# Patient Record
Sex: FEMALE | Race: WHITE
[De-identification: names, ages, dates, MRNs, and addresses within clinical notes are randomized per-mention and may not be internally consistent; named-entity substitution may affect disease eponyms.]

---

## 2017-11-26 ENCOUNTER — HOSPITAL ENCOUNTER (EMERGENCY)
Dept: HOSPITAL 10 - E/R | Age: 82
Discharge: HOME | End: 2017-11-26
Payer: MEDICARE

## 2017-11-26 VITALS
BODY MASS INDEX: 25.97 KG/M2 | HEIGHT: 60 IN | WEIGHT: 132.28 LBS | WEIGHT: 132.28 LBS | HEIGHT: 60 IN | BODY MASS INDEX: 25.97 KG/M2
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
## 2017-11-26 DIAGNOSIS — S01.01XA: Primary | ICD-10-CM
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
## 2017-11-26 DIAGNOSIS — E03.9: ICD-10-CM

## 2017-11-26 PROCEDURE — 70450 CT HEAD/BRAIN W/O DYE: CPT

## 2017-11-26 PROCEDURE — 71010: CPT

## 2017-11-26 PROCEDURE — 72125 CT NECK SPINE W/O DYE: CPT

## 2017-11-26 NOTE — RADRPT
PROCEDURE:   XR Chest. 

 

CLINICAL INDICATION:   Chest pain

 

TECHNIQUE:   Single frontal view  of the chest was obtained. 

 

COMPARISON:   None 

 

FINDINGS:

The heart is within normal limits.  The thoracic aorta is calcified.

There are mild bilateral lower lobe linear atelectatic changes.

The lungs are otherwise clear.

There is no pleural effusion or pneumothorax.   

 

RPTAT: AA

 

IMPRESSION:

Mild bilateral lower lobe linear atelectatic changes.

Calcified aorta consistent with atherosclerotic disease.

_____________________________________________ 

.Wolfgang Ramírez MD, MD           Date    Time 

Electronically viewed and signed by .Wolfgang Ramírez MD, MD on 11/26/2017 13:17 

 

D:  11/26/2017 13:17  T:  11/26/2017 13:17

.S/

## 2017-11-26 NOTE — RADRPT
PROCEDURE:  CT cervical spine without contrast. 

 

CLINICAL INDICATION:   Neck trauma/injury, fall

 

TECHNIQUE:  CT of the cervical spine without contrast was performed on a multidetector CT scanner, w
ith multiplanar reformats.  One or more of the following dose reduction techniques were used: Automa
tiera exposure control, adjustment in mA and / or kV according to patient size, use of iterative recon
structive technique.  CTDIvol = 22 mGy and DLP = 520 mGy-cm.  DICOM images are available.

 

COMPARISON:   None available.

 

FINDINGS:

There is generalized osteopenia. No acute fracture or dislocation is identified. There is a chronic 
corticated defect in the posterior arch of C1 which may be developmental, versus post-traumatic.  Th
ere is reversal of the lordosis of the upper - mid cervical spine.  Trace anterolisthesis at C2-3 an
d C7-T1, and trace retrolisthesis at C4-5 are seen.  The vertebral bodies are maintained in height. 
  There are atlantoaxial joint degenerative changes. There are multilevel anterior osteophytes.  The
re is disc space narrowing, severe at C4-5, moderate to severe at C3-4, mild-moderate at C2-3 , C5-6
 and C6-7. Additional findings by levels:

 

C2-3: Posterior disk/osteophyte and ligamentum flavum hypertrophy. Mild to moderate central canal st
enosis.   Uncovertebral osteophytes and facet arthropathy with severe right foraminal narrowing.

 

C3-4: Posterior disk/osteophyte with mild central canal stenosis.   Uncovertebral osteophytes and fa
cet arthropathy with severe right, mild-moderate left foraminal narrowing.

 

C4-5: Posterior disk/osteophyte with moderate - severe central canal stenosis.   Uncovertebral osteo
phytes and facet arthropathy with severe bilateral foraminal narrowing.

 

C5-6: Posterior disk/osteophyte with mild - moderate central canal stenosis.   Uncovertebral osteoph
ytes and facet arthropathy with moderate - severe bilateral foraminal narrowing.

 

C6-7:  Posterior disk/osteophyte with mild - moderate central canal stenosis.   Uncovertebral osteop
hytes and facet arthropathy with mild - moderate bilateral foraminal narrowing

 

C7-T1: Facet arthropathy.  No acquired central canal stenosis identified.

 

  

 

IMPRESSION:

1.  No acute fracture/dislocation identified.

2.  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
umatic. 

3.  Cervical spondylosis/degenerative enthesopathy as described above, with reversal of the cervical
 lordosis.

4.  Central canal stenosis, moderate - severe at C4-5, mild - moderate at C2-3, C5-6 and C6-7, mild 
at C3-4.

5.  Significant multilevel foraminal narrowing detailed above.

6.  Osteopenia.

 

 

RPTAT: HH

_____________________________________________ 

.Carlos Enrique Jean MD, MD           Date    Time 

Electronically viewed and signed by .Carlos Enrique Jean MD, MD on 11/26/2017 14:04 

 

D:  11/26/2017 14:04  T:  11/26/2017 14:04

.O/

## 2017-11-26 NOTE — ERD
ER Documentation


Chief Complaint


Chief Complaint


MECHANICAL FALL LAC TO HEAD





HPI


Patient is a 86-year-old female who presents after a witnessed ground-level 

mechanical fall at her nursing home with injury to her head.  There is no 

report of loss of consciousness or vomiting.  There is no report of other 

injury.  There is no report of any physical complaint prior to the episode.  

History is limited due to patient's underlying dementia.  On further history 

from the nursing home, the patient was noted to fall backwards in her chair 

while she was reaching to pet a dog.





ROS


All systems reviewed and are negative except as per history of present illness.





Medications


Home Meds


Reported Medications


Acetaminophen* (Acetaminophen*) 500 MG Extra Strength Tablet, 1000 MG PO QHS Y 

for PAIN AND OR ELEVATED TEMP, TAB


   11/26/17


Trazodone Hcl* (Trazodone Hcl*) 50 Mg Tablet, 50 MG PO Q8 Y for SLEEP, #60 TAB


   11/26/17


Memantine* (Namenda* XR) 28 Mg Cap.spr.24, 28 MG PO DAILY, #30 TAB


   11/26/17


Loratadine* (Loratadine*) 10 Mg Tablet, 10 MG PO DAILY, #30 TAB


   11/26/17


Levothyroxine Sodium* (Levoxyl*) 150 Mcg Tablet, 150 MCG PO WED,AND SUN, #30 TAB


   11/26/17


Levothyroxine Sodium* (Levoxyl*) 125 Mcg Tablet, 125 MCG PO MON,TUE,THUR,FRI,SAT

, #30 TAB


   11/26/17


Donepezil* (Donepezil*) 5 Mg Tablet, 5 MG PO QHS, #30 TAB


   11/26/17


Cyanocobalamin* (Vitamin B12*) 500 Mcg Tab, 500 MCG PO DAILY, TAB


   11/26/17


Cholecalciferol (Vitamin D3) (VITAMIN D-3) 2,000 Unit Capsule, 2000 UNIT PO 

DAILY, CAP


   11/26/17


Celecoxib* (Celebrex*) 100 Mg Capsule, 100 MG PO BID, CAP


   11/26/17


Salmeterol Xinaf/Fluticasone* (Advair*) 250-50 Diskus Inhaler, 1 INH INHALATION 

BID, #1 INHALER


   11/26/17





Allergies


Allergies:  


Coded Allergies:  


     Sulfa (Sulfonamide Antibiotics) (Verified  Allergy, Unknown, 11/26/17)





PMhx/Soc


Past medical history: Dementia, hypothyroidism


Past surgical history: Unable to obtain


Social history: Lives in nursing home, reports occasional prior tobacco


Medical and Surgical Hx:  pt denies Medical Hx, pt denies Surgical Hx


Hx Alcohol Use:  No


Hx Substance Use:  No


Hx Tobacco Use:  No


Smoking Status:  Never smoker





FmHx


Noncontributory





Physical Exam


Vitals





Vital Signs








  Date Time  Temp Pulse Resp B/P Pulse Ox O2 Delivery O2 Flow Rate FiO2


 


11/26/17 12:27 98.0 72 16 141/79 95   








Physical Exam


Const:     Alert, no acute distress


Head:   Atraumatic, Left occipital laceration, approximately 3 cm, linear.  

Soft tissue hematoma.


Eyes:    Normal Conjunctiva


ENT:    Normal External Ears, Nose and Mouth. Mucous membranes moist


Neck:               Full range of motion.  No midline tenderness


Resp:    Clear to auscultation bilaterally, No wheezes, no rales


Cardio:    Regular rate and rhythm, no murmurs


Abd:    Soft, non tender, non distended. Normal bowel sounds


Skin:    No petechiae or rashes


Back:    No midline or flank tenderness


Ext:    No cyanosis, or edema, No deformity or pain with ranging of joints.


Neur:    Awake and alert, Cranial nerves II through XII intact bilaterally, 

strength and sensation full in 4 extremities.


Psych:    Normal Mood and Affect


Results 24 hrs








 Current Medications








 Medications


  (Trade)  Dose


 Ordered  Sig/Agnes


 Route


 PRN Reason  Start Time


 Stop Time Status Last Admin


Dose Admin


 


 Lidocaine/


 Epinephrine


  (Xylocaine 1%/


 Epi (Pf))  30 ml  ONCE  STAT


 INJ


   11/26/17 12:50


 11/26/17 12:51 DC  


 














Procedures/MDM


Laceration Repair by me:


Anesthesia:                             1% lidocaine with epinephrine locally


Location:                                  Left occiput


Foreign body:                           None detected after copious irrigation 

and exploration


Technique:                              Staples 4


Complexity:                             No subcutaneous sutures/mucosal repair/

edge excision


Post Closure Length:             3 cm





Patient's bleeding was easily controlled in the department and there is no 

indication of anemia.


No evidence of neurologic injury, vascular injury, or foreign body.


Patient is appropriate for outpatient follow up.





MDM: Patient is an 86-year-old female who had a mechanical fall from a chair 

with trauma to her head.  CT of the head shows no acute injury.  CT of the 

cervical spine shows no fracture but does show significant degenerative 

disease.  The patient has a normal neurological examination.  She has no 

midline tenderness.  There are no other signs of injury and tertiary survey.  

The patient is not on blood thinners.  Laceration was irrigated with copious 

saline and repaired with staples 4.  Instructions given for staple removal in 

7 days.  Tetanus is up-to-date per daughter.





Departure


Diagnosis:  


 Primary Impression:  


 Scalp laceration


 Encounter type:  initial encounter  Qualified Code:  S01.01XA - Laceration of 

scalp, initial encounter


Condition:  Stable











HIREN BROOKS MD Nov 26, 2017 12:50

## 2017-11-26 NOTE — RADRPT
PROCEDURE:  CT brain without IV contrast. 

 

CLINICAL INDICATION:  Headache/trauma. 

 

TECHNIQUE:   CT examination of the brain was performed on a 64-slice multidetector scanner.  The pat
ient was examined without IV contrast.  Sagittal and coronal reformatted images were made.  The imag
es were reviewed on a PACS workstation.  DICOM images are available.

 

Total radiation dose: Total CTDIvol:   44.9 mGy. Total DLP: 720 mGy-cm.  One or more of the followin
g dose reduction techniques were used: automated exposure control, adjustment of the mA and/or kV ac
cording to patient size, or use of iterative reconstruction technique

 

 

COMPARISON:   None available. 

 

FINDINGS:

 

There is mild cerebral atrophy.  There is mild periventricular low density white matter changes, lik
ely microvascular ischemic changes.  The gray/white matter differentiation is well preserved.  There
 is no other abnormal intra-axial high, low density lesion, suggesting tumor, infarct, bleeding, av 
malformation or inflammatory mass.  

 

No subdural or epidural hematoma.  The visualized paranasal sinuses and mastoid air cells are clear.
  The orbits are unremarkable.  The calvarium is intact.  There is mild left parietal scalp swelling
.

 

IMPRESSION:

 

1.  Mild cerebral atrophy.

 

2.  Mild periventricular low density white matter changes, likely microvascular ischemic changes. 

 

RPTAT: GG

_____________________________________________ 

.Monroe Cui MD, MD           Date    Time 

Electronically viewed and signed by .Monroe Cui MD, MD on 11/26/2017 13:39 

 

D:  11/26/2017 13:39  T:  11/26/2017 13:39

.Y/

## 2019-01-12 ENCOUNTER — HOSPITAL ENCOUNTER (INPATIENT)
Dept: HOSPITAL 91 - E/R | Age: 84
LOS: 7 days | Discharge: NURSING, REHAB OR LONG-TERM CARE | DRG: 871 | End: 2019-01-19
Payer: MEDICARE

## 2019-01-12 ENCOUNTER — HOSPITAL ENCOUNTER (INPATIENT)
Dept: HOSPITAL 10 - E/R | Age: 84
LOS: 7 days | Discharge: NURSING, REHAB OR LONG-TERM CARE | DRG: 871 | End: 2019-01-19
Admitting: INTERNAL MEDICINE
Payer: MEDICARE

## 2019-01-12 VITALS — HEART RATE: 80 BPM | DIASTOLIC BLOOD PRESSURE: 77 MMHG | RESPIRATION RATE: 18 BRPM | SYSTOLIC BLOOD PRESSURE: 170 MMHG

## 2019-01-12 VITALS — SYSTOLIC BLOOD PRESSURE: 141 MMHG | RESPIRATION RATE: 18 BRPM | HEART RATE: 80 BPM | DIASTOLIC BLOOD PRESSURE: 78 MMHG

## 2019-01-12 VITALS — DIASTOLIC BLOOD PRESSURE: 67 MMHG | HEART RATE: 102 BPM | RESPIRATION RATE: 16 BRPM | SYSTOLIC BLOOD PRESSURE: 143 MMHG

## 2019-01-12 VITALS
HEIGHT: 65 IN | WEIGHT: 167.33 LBS | HEIGHT: 65 IN | WEIGHT: 167.33 LBS | BODY MASS INDEX: 27.88 KG/M2 | BODY MASS INDEX: 27.88 KG/M2

## 2019-01-12 DIAGNOSIS — R13.10: ICD-10-CM

## 2019-01-12 DIAGNOSIS — Z79.82: ICD-10-CM

## 2019-01-12 DIAGNOSIS — J44.0: ICD-10-CM

## 2019-01-12 DIAGNOSIS — Z86.73: ICD-10-CM

## 2019-01-12 DIAGNOSIS — Z88.2: ICD-10-CM

## 2019-01-12 DIAGNOSIS — F03.90: ICD-10-CM

## 2019-01-12 DIAGNOSIS — B34.9: ICD-10-CM

## 2019-01-12 DIAGNOSIS — Z87.891: ICD-10-CM

## 2019-01-12 DIAGNOSIS — E11.9: ICD-10-CM

## 2019-01-12 DIAGNOSIS — N39.0: ICD-10-CM

## 2019-01-12 DIAGNOSIS — Z90.12: ICD-10-CM

## 2019-01-12 DIAGNOSIS — Z66: ICD-10-CM

## 2019-01-12 DIAGNOSIS — A41.9: Primary | ICD-10-CM

## 2019-01-12 DIAGNOSIS — J40: ICD-10-CM

## 2019-01-12 DIAGNOSIS — J18.9: ICD-10-CM

## 2019-01-12 DIAGNOSIS — I10: ICD-10-CM

## 2019-01-12 DIAGNOSIS — J96.01: ICD-10-CM

## 2019-01-12 DIAGNOSIS — R62.7: ICD-10-CM

## 2019-01-12 DIAGNOSIS — E03.9: ICD-10-CM

## 2019-01-12 LAB
ADD MAN DIFF?: NO
ADD UMIC: YES
ALANINE AMINOTRANSFERASE: 21 IU/L (ref 13–69)
ALBUMIN/GLOBULIN RATIO: 0.95
ALBUMIN: 3.8 G/DL (ref 3.3–4.9)
ALKALINE PHOSPHATASE: 139 IU/L (ref 42–121)
AMYLASE: 36 U/L (ref 11–123)
ANION GAP: 4 (ref 5–13)
ASPARTATE AMINO TRANSFERASE: 30 IU/L (ref 15–46)
B-TYPE NATRIURETIC PEPTIDE: 4900 PG/ML (ref 0–450)
BASOPHIL #: 0.1 10^3/UL (ref 0–0.1)
BASOPHILS %: 0.4 % (ref 0–2)
BILIRUBIN,DIRECT: 0 MG/DL (ref 0–0.2)
BILIRUBIN,TOTAL: 0.4 MG/DL (ref 0.2–1.3)
BLOOD UREA NITROGEN: 16 MG/DL (ref 7–20)
CALCIUM: 10.2 MG/DL (ref 8.4–10.2)
CARBON DIOXIDE: 35 MMOL/L (ref 21–31)
CHLORIDE: 101 MMOL/L (ref 97–110)
CREATININE: 0.83 MG/DL (ref 0.44–1)
EOSINOPHILS #: 0 10^3/UL (ref 0–0.5)
EOSINOPHILS %: 0.1 % (ref 0–7)
FREE T4 (FREE THYROXINE): 2.01 NG/DL (ref 0.85–1.93)
GLOBULIN: 4 G/DL (ref 1.3–3.2)
GLUCOSE: 152 MG/DL (ref 70–220)
HEMATOCRIT: 42.3 % (ref 37–47)
HEMOGLOBIN A1C: 5.2 % (ref 0–5.9)
HEMOGLOBIN: 13.4 G/DL (ref 12–16)
IMMATURE GRANS #M: 0.16 10^3/UL (ref 0–0.03)
IMMATURE GRANS % (M): 1 % (ref 0–0.43)
INR: 0.98
LACTIC ACID: 2.5 MMOL/L (ref 0.5–2)
LIPASE: 12 U/L (ref 23–300)
LYMPHOCYTES #: 2.1 10^3/UL (ref 0.8–2.9)
LYMPHOCYTES %: 13.1 % (ref 15–51)
MEAN CORPUSCULAR HEMOGLOBIN: 29.8 PG (ref 29–33)
MEAN CORPUSCULAR HGB CONC: 31.7 G/DL (ref 32–37)
MEAN CORPUSCULAR VOLUME: 94.2 FL (ref 82–101)
MEAN PLATELET VOLUME: 11.4 FL (ref 7.4–10.4)
MONOCYTE #: 1 10^3/UL (ref 0.3–0.9)
MONOCYTES %: 6.1 % (ref 0–11)
NEUTROPHIL #: 12.5 10^3/UL (ref 1.6–7.5)
NEUTROPHILS %: 79.3 % (ref 39–77)
NUCLEATED RED BLOOD CELLS #: 0 10^3/UL (ref 0–0)
NUCLEATED RED BLOOD CELLS%: 0 /100WBC (ref 0–0)
PARTIAL THROMBOPLASTIN TIME: 29.1 SEC (ref 23–35)
PLATELET COUNT: 277 10^3/UL (ref 140–415)
POTASSIUM: 4.1 MMOL/L (ref 3.5–5.1)
PROTIME: 13.1 SEC (ref 11.9–14.9)
PT RATIO: 1
RED BLOOD COUNT: 4.49 10^6/UL (ref 4.2–5.4)
RED CELL DISTRIBUTION WIDTH: 13.7 % (ref 11.5–14.5)
SODIUM: 140 MMOL/L (ref 135–144)
THYROID STIMULATING HORMONE: 0.77 MIU/L (ref 0.47–4.68)
TOTAL PROTEIN: 7.8 G/DL (ref 6.1–8.1)
TROPONIN-I: 0.03 NG/ML (ref 0–0.12)
UR ASCORBIC ACID: 40 MG/DL
UR BACTERIA: (no result) /HPF
UR BILIRUBIN (DIP): NEGATIVE MG/DL
UR BLOOD (DIP): NEGATIVE MG/DL
UR CLARITY: (no result)
UR COLOR: (no result)
UR GLUCOSE (DIP): NEGATIVE MG/DL
UR GRANULAR CAST: (no result) /HPF
UR HYALINE CAST: (no result) /HPF
UR KETONES (DIP): NEGATIVE MG/DL
UR LEUKOCYTE ESTERASE (DIP): (no result) LEU/UL
UR MUCUS: (no result) /HPF
UR NITRITE (DIP): POSITIVE MG/DL
UR PH (DIP): 5 (ref 5–9)
UR RBC: 3 /HPF (ref 0–5)
UR SPECIFIC GRAVITY (DIP): 1.03 (ref 1–1.03)
UR SQUAMOUS EPITHELIAL CELL: (no result) /HPF
UR TOTAL PROTEIN (DIP): (no result) MG/DL
UR UROBILINOGEN (DIP): (no result) MG/DL
UR WBC: 61 /HPF (ref 0–5)
WHITE BLOOD COUNT: 15.8 10^3/UL (ref 4.8–10.8)

## 2019-01-12 PROCEDURE — 80053 COMPREHEN METABOLIC PANEL: CPT

## 2019-01-12 PROCEDURE — 94640 AIRWAY INHALATION TREATMENT: CPT

## 2019-01-12 PROCEDURE — 87086 URINE CULTURE/COLONY COUNT: CPT

## 2019-01-12 PROCEDURE — 83880 ASSAY OF NATRIURETIC PEPTIDE: CPT

## 2019-01-12 PROCEDURE — 82150 ASSAY OF AMYLASE: CPT

## 2019-01-12 PROCEDURE — 96375 TX/PRO/DX INJ NEW DRUG ADDON: CPT

## 2019-01-12 PROCEDURE — 97530 THERAPEUTIC ACTIVITIES: CPT

## 2019-01-12 PROCEDURE — 84443 ASSAY THYROID STIM HORMONE: CPT

## 2019-01-12 PROCEDURE — 83735 ASSAY OF MAGNESIUM: CPT

## 2019-01-12 PROCEDURE — 85025 COMPLETE CBC W/AUTO DIFF WBC: CPT

## 2019-01-12 PROCEDURE — 84484 ASSAY OF TROPONIN QUANT: CPT

## 2019-01-12 PROCEDURE — C9113 INJ PANTOPRAZOLE SODIUM, VIA: HCPCS

## 2019-01-12 PROCEDURE — 84439 ASSAY OF FREE THYROXINE: CPT

## 2019-01-12 PROCEDURE — 97161 PT EVAL LOW COMPLEX 20 MIN: CPT

## 2019-01-12 PROCEDURE — 97110 THERAPEUTIC EXERCISES: CPT

## 2019-01-12 PROCEDURE — 93005 ELECTROCARDIOGRAM TRACING: CPT

## 2019-01-12 PROCEDURE — 94644 CONT INHLJ TX 1ST HOUR: CPT

## 2019-01-12 PROCEDURE — 96365 THER/PROPH/DIAG IV INF INIT: CPT

## 2019-01-12 PROCEDURE — 85610 PROTHROMBIN TIME: CPT

## 2019-01-12 PROCEDURE — 83036 HEMOGLOBIN GLYCOSYLATED A1C: CPT

## 2019-01-12 PROCEDURE — 96366 THER/PROPH/DIAG IV INF ADDON: CPT

## 2019-01-12 PROCEDURE — 87081 CULTURE SCREEN ONLY: CPT

## 2019-01-12 PROCEDURE — 92610 EVALUATE SWALLOWING FUNCTION: CPT

## 2019-01-12 PROCEDURE — 85730 THROMBOPLASTIN TIME PARTIAL: CPT

## 2019-01-12 PROCEDURE — 83690 ASSAY OF LIPASE: CPT

## 2019-01-12 PROCEDURE — 87400 INFLUENZA A/B EACH AG IA: CPT

## 2019-01-12 PROCEDURE — 99291 CRITICAL CARE FIRST HOUR: CPT

## 2019-01-12 PROCEDURE — 83605 ASSAY OF LACTIC ACID: CPT

## 2019-01-12 PROCEDURE — 92526 ORAL FUNCTION THERAPY: CPT

## 2019-01-12 PROCEDURE — 94664 DEMO&/EVAL PT USE INHALER: CPT

## 2019-01-12 PROCEDURE — 36415 COLL VENOUS BLD VENIPUNCTURE: CPT

## 2019-01-12 PROCEDURE — 80048 BASIC METABOLIC PNL TOTAL CA: CPT

## 2019-01-12 PROCEDURE — 81001 URINALYSIS AUTO W/SCOPE: CPT

## 2019-01-12 PROCEDURE — 87040 BLOOD CULTURE FOR BACTERIA: CPT

## 2019-01-12 PROCEDURE — 71045 X-RAY EXAM CHEST 1 VIEW: CPT

## 2019-01-12 PROCEDURE — 84100 ASSAY OF PHOSPHORUS: CPT

## 2019-01-12 RX ADMIN — THIAMINE HYDROCHLORIDE 1 MLS/HR: 100 INJECTION, SOLUTION INTRAMUSCULAR; INTRAVENOUS at 13:09

## 2019-01-12 RX ADMIN — CEFEPIME HYDROCHLORIDE 1 MLS/HR: 2 INJECTION, POWDER, FOR SOLUTION INTRAVENOUS at 09:44

## 2019-01-12 RX ADMIN — FOLIC ACID SCH MLS/HR: 5 INJECTION, SOLUTION INTRAMUSCULAR; INTRAVENOUS; SUBCUTANEOUS at 13:09

## 2019-01-12 RX ADMIN — VANCOMYCIN HYDROCHLORIDE 1 MLS/HR: 1 INJECTION, POWDER, LYOPHILIZED, FOR SOLUTION INTRAVENOUS at 09:45

## 2019-01-12 RX ADMIN — PIPERACILLIN SODIUM AND TAZOBACTAM SODIUM SCH MLS/HR: 3; .375 INJECTION, POWDER, LYOPHILIZED, FOR SOLUTION INTRAVENOUS at 17:38

## 2019-01-12 RX ADMIN — THIAMINE HYDROCHLORIDE 1 ML: 100 INJECTION, SOLUTION INTRAMUSCULAR; INTRAVENOUS at 09:45

## 2019-01-12 RX ADMIN — VANCOMYCIN HYDROCHLORIDE 1 MLS/HR: 500 INJECTION, POWDER, LYOPHILIZED, FOR SOLUTION INTRAVENOUS at 16:14

## 2019-01-12 RX ADMIN — METHYLPREDNISOLONE SODIUM SUCCINATE 1 MG: 125 INJECTION, POWDER, FOR SOLUTION INTRAMUSCULAR; INTRAVENOUS at 22:23

## 2019-01-12 RX ADMIN — IPRATROPIUM BROMIDE AND ALBUTEROL SULFATE 1 ML: .5; 3 SOLUTION RESPIRATORY (INHALATION) at 20:42

## 2019-01-12 RX ADMIN — IPRATROPIUM BROMIDE 1 MG: 0.5 SOLUTION RESPIRATORY (INHALATION) at 08:56

## 2019-01-12 RX ADMIN — METHYLPREDNISOLONE SODIUM SUCCINATE 1 MG: 125 INJECTION, POWDER, FOR SOLUTION INTRAMUSCULAR; INTRAVENOUS at 15:08

## 2019-01-12 RX ADMIN — PIPERACILLIN SODIUM AND TAZOBACTAM SODIUM 1 MLS/HR: 3; .375 INJECTION, POWDER, LYOPHILIZED, FOR SOLUTION INTRAVENOUS at 17:38

## 2019-01-12 RX ADMIN — HALOPERIDOL LACTATE 1 MG: 5 INJECTION, SOLUTION INTRAMUSCULAR at 21:46

## 2019-01-12 RX ADMIN — HALOPERIDOL LACTATE 1 MG: 5 INJECTION, SOLUTION INTRAMUSCULAR at 20:43

## 2019-01-12 RX ADMIN — ALBUTEROL SULFATE 1 MG: 2.5 SOLUTION RESPIRATORY (INHALATION) at 08:55

## 2019-01-12 RX ADMIN — METHYLPREDNISOLONE SODIUM SUCCINATE 1 MG: 125 INJECTION, POWDER, FOR SOLUTION INTRAMUSCULAR; INTRAVENOUS at 09:45

## 2019-01-12 RX ADMIN — IPRATROPIUM BROMIDE AND ALBUTEROL SULFATE SCH ML: .5; 3 SOLUTION RESPIRATORY (INHALATION) at 20:42

## 2019-01-12 RX ADMIN — LORAZEPAM 1 MG: 2 INJECTION, SOLUTION INTRAMUSCULAR; INTRAVENOUS at 21:46

## 2019-01-12 NOTE — ERD
ER Documentation


Chief Complaint


Chief Complaint





SOB, Hypoxic wheezing since this morning





HPI


This is an 87-year-old female who presented to the emergency department by EMS 


from [facility] with a known history of COPD, hypertension, 


hypothyroidism.  The patient is a DO NOT RESUSCITATE with comfort measures only.


 This morning upon awakening the patient developed severe difficulty breathing 


with wheezing.  There was no emesis.  EMS administered 2.5 mg of albuterol the 


patient was hypoxic at roughly 89%.  This had improved her symptoms.  No further


history is available.





ROS


All systems reviewed and are negative except as per history of present illness.





Medications


Home Meds


Reported Medications


Trazodone Hcl* (Trazodone Hcl*) 50 Mg Tablet, 25 MG PO QHS, #30 TAB


   1/12/19


Aspirin* (Aspirin* EC) 81 Mg Tablet.dr, 81 MG PO DAILY, TAB


   1/12/19


Levothyroxine Sodium* (Levothyroxine Sodium*) 150 Mcg Tablet, 150 MCG PO BEFORE 


BREAKFAST, #30 TAB


   Q WED-SUN


   1/12/19


Levothyroxine Sodium* (Levothyroxine Sodium*) 125 Mcg Tablet, 125 MCG PO BEFORE 


BREAKFAST, #30 TAB


   Q [id number]-SAT


   1/12/19


Acetaminophen* (Acetaminophen*) 325 Mg Tablet, 325 MG PO Q6H PRN for PAIN AND OR


ELEVATED TEMP, #30 TAB


   1/12/19


Multivitamins* (Theragran*) 1 Tab Tab, 1 TAB PO DAILY, TAB


   1/12/19


Lorazepam* (Lorazepam*) 0.5 Mg Tablet, 0.25 MG PO HS PRN for ANXIETY, TAB


   1/12/19


Loratadine* (Loratadine*) 10 Mg Tablet, 10 MG PO DAILY, #30 TAB


   1/12/19


Celecoxib* (Celebrex*) 100 Mg Capsule, 100 MG PO BID, CAP


   1/12/19


Discontinued Reported Medications


Acetaminophen* (Acetaminophen*) 500 MG Extra Strength Tablet, 1000 MG PO QHS PRN


for PAIN AND OR ELEVATED TEMP, TAB


   11/26/17


Trazodone Hcl* (Trazodone Hcl*) 50 Mg Tablet, 50 MG PO Q8 PRN for SLEEP, #60 TAB


   11/26/17


Memantine* (Namenda* XR) 28 Mg Cap.spr.24, 28 MG PO DAILY, #30 TAB


   11/26/17


Loratadine* (Loratadine*) 10 Mg Tablet, 10 MG PO DAILY, #30 TAB


   11/26/17


Levothyroxine Sodium* (Levoxyl*) 150 Mcg Tablet, 150 MCG PO WED,AND SUN, #30 TAB


   11/26/17


Levothyroxine Sodium* (Levoxyl*) 125 Mcg Tablet, 125 MCG PO MON,TUE,THUR,


FRI,SAT, #30 TAB


   11/26/17


Donepezil* (Donepezil*) 5 Mg Tablet, 5 MG PO QHS, #30 TAB


   11/26/17


Cyanocobalamin* (Vitamin B12*) 500 Mcg Tab, 500 MCG PO DAILY, TAB


   11/26/17


Cholecalciferol (Vitamin D3) (VITAMIN D-3) 2,000 Unit Capsule, 2000 UNIT PO 


DAILY, CAP


   11/26/17


Celecoxib* (Celebrex*) 100 Mg Capsule, 100 MG PO BID, CAP


   11/26/17


Salmeterol Xinaf/Fluticasone* (Advair*) 250-50 Diskus Inhaler, 1 INH INHALATION 


BID, #1 INHALER


   11/26/17





Allergies


Allergies:  


Coded Allergies:  


     Sulfa (Sulfonamide Antibiotics) (Verified  Allergy, Unknown, 1/12/19)





PMhx/Soc


History of Surgery:  Yes (Lt Mastectomy)


Hx Neurological Disorder:  Yes (Dementia, CVA)


Hx Cardiac Disorders:  Yes (HTN)


Hx Miscellaneous Medical Probl:  Yes (DM, GERD)


Hx Alcohol Use:  No


Hx Substance Use:  No


Hx Tobacco Use:  No


Smoking Status:  Never smoker





Physical Exam


Vitals





Vital Signs


  Date      Temp  Pulse  Resp  B/P (MAP)   Pulse Ox  O2          O2 Flow    FiO2


Time                                                 Delivery    Rate


   1/12/19          106    24                   100  Simple            6.0


     08:56                                           Mask


   1/12/19                                           Simple              6


     08:56                                           Mask


   1/12/19          105    21      161/99        99  Mask              6.0


     08:53                          (119)


   1/12/19  98.8    101    22      161/99       100


     08:46                          (119)





Physical Exam


Constitutional:Well-developed. Well-nourished.  Patient in severe respiratory 


distress


HEENT:Normocephalic. Atraumatic.Pupils were equal round reactive to light. Moist


mucous membranes.No tonsillar exudates.


Neck: No nuchal rigidity. No lymphadenopathy. No posterior cervical spine 


tenderness or step-offs.


Respiratory: Tachypneic.  Using accessory muscles of respiration.  Wheezing on 


end auscultation bilaterally


Cardiovascular: Regular rate regular rhythm.No murmurs. No rubs were appre


ciated.S1, S2 normal. Distal pulses are palpable 2+ bilaterally.


GI: Abdomen was soft. Nontender. Non Distended. No pulsatile abdominal masses or


bruits. No rebound. No guarding. Bowel sounds were present and normal. 


Muscle skeletal: Full range of motion of both the upper extremities.  Patient 


does not move lower extremities against gravity..No assymetrical calf tenderness


or swelling. 


Skin: No petechia, no purpura. No lesions on the palms or the soles of the feet.


No maculopapular rash.


NEURO: Patient was alert, awake, orientated to person but not to place or time. 


Patient does not follow verbal command.  Gait not observed.


Result Diagram:  


1/12/19 0924 1/12/19 0924





Results 24 hrs





Laboratory Tests


       Test
                                 1/12/19
09:24  1/12/19
09:37


       White Blood Count                     15.8 10^3/ul


       Red Blood Count                       4.49 10^6/ul


       Hemoglobin                               13.4 g/dl


       Hematocrit                                  42.3 %


       Mean Corpuscular Volume                    94.2 fl


       Mean Corpuscular Hemoglobin                29.8 pg


       Mean Corpuscular Hemoglobin
Concent     31.7 g/dl 
  



       Red Cell Distribution Width                 13.7 %


       Platelet Count                         277 10^3/UL


       Mean Platelet Volume                       11.4 fl


       Immature Granulocytes %                    1.000 %


       Neutrophils %                               79.3 %


       Lymphocytes %                               13.1 %


       Monocytes %                                  6.1 %


       Eosinophils %                                0.1 %


       Basophils %                                  0.4 %


       Nucleated Red Blood Cells %            0.0 /100WBC


       Immature Granulocytes #              0.160 10^3/ul


       Neutrophils #                         12.5 10^3/ul


       Lymphocytes #                          2.1 10^3/ul


       Monocytes #                            1.0 10^3/ul


       Eosinophils #                          0.0 10^3/ul


       Basophils #                            0.1 10^3/ul


       Nucleated Red Blood Cells #            0.0 10^3/ul


       Prothrombin Time                          13.1 Sec


       Prothrombin Time Ratio                         1.0


       INR International Normalized
Ratio           0.98 
  



       Activated Partial
Thromboplast Time      29.1 Sec 
  



       Sodium Level                            140 mmol/L


       Potassium Level                         4.1 mmol/L


       Chloride Level                          101 mmol/L


       Carbon Dioxide Level                     35 mmol/L


       Anion Gap                                        4


       Blood Urea Nitrogen                       16 mg/dl


       Creatinine                              0.83 mg/dl


       Est Glomerular Filtrat Rate
mL/min    mL/min 
       



       Glucose Level                            152 mg/dl


       Calcium Level                           10.2 mg/dl


       Total Bilirubin                          0.4 mg/dl


       Direct Bilirubin                        0.00 mg/dl


       Indirect Bilirubin                       0.4 mg/dl


       Aspartate Amino Transf
(AST/SGOT)         30 IU/L 
  



       Alanine Aminotransferase
(ALT/SGPT)       21 IU/L 
  



       Alkaline Phosphatase                      139 IU/L


       Troponin I                             0.031 ng/ml


       Total Protein                             7.8 g/dl


       Albumin                                   3.8 g/dl


       Globulin                                 4.00 g/dl


       Albumin/Globulin Ratio                        0.95


       Amylase Level                               36 U/L


       Lipase                                      12 U/L


       POC Venous Lactate                                     1.7 mmol/L





Current Medications


 Medications
   Dose
          Sig/Agnes
       Start Time
   Status  Last


 (Trade)       Ordered        Route
 PRN     Stop Time              Admin
Dose


                              Reason                                Admin


 Sodium         2,400 ml       BOLUS OVER 2   1/12/19       DC           1/12/19


Chloride
                     HOURS STAT
    08:47
                       09:45



(NS)                          IV*
           1/12/19 08:52


 Cefepime HCl   50 ml @ 
      ONCE  STAT
    1/12/19       DC           1/12/19


               100 mls/hr     IVPB
          08:47
                       09:44



                                             1/12/19 09:16


 Vancomycin     250 ml @ 
     ONCE  ONCE
    1/12/19       DC           1/12/19


HCl            125 mls/hr     IVPB
          09:00
                       09:45



                                             1/12/19 10:59


 Albuterol
     10 mg          ONCE  STAT
    1/12/19       DC           1/12/19


(Proventil                    INH
           08:47
                       08:55



0.5%
  (Neb))                                1/12/19 08:52


 Ipratropium
   1 mg           ONCE  STAT
    1/12/19       DC           1/12/19


Bromide
                      INH
           08:47
                       08:56



(Atrovent                                    1/12/19 08:52


0.02%



(Neb))


                125 mg         ONCE  STAT
    1/12/19       DC           1/12/19


Methylprednis                 IV
            08:47
                       09:45



olone
 Sodium                                1/12/19 08:52


Succinate



(Solu-Medrol)


 Ondansetron    4 mg           BRIDGE ORDER   1/12/19                



HCl
  (Zofran                 PRN
 IV
       11:30



Inj)                          NAUSEA AND/OR  1/13/19 11:29


                              VOMITING


                650 mg         ER BRIDGE      1/12/19                



Acetaminophen                 PRN
 PO
 MILD  11:30




  (Tylenol                   PAIN(1-3)OR    1/13/19 11:29


Tab)                          ELEVATED TEMP








Procedures/MDM


The patient presented to the emergency department with dyspnea. My differential 


diagnosis included but was not limited to upper airway obstruction, CHF, 


pulmonary embolism, cardiac ischemia, pneumonia, pneumothorax, anemia, drug 


overdose, pulmonary edema, COPD or asthma. 





The patient was admitted placed on continuous albuterol Atrovent 125 mg of Solu-


Medrol.  Her symptoms had significantly improved.  She still had wheezing 


bilaterally but was not using accessory muscles of respiration.





12 Lead EKG tracing ordered and reviewed by myself showed: 


Sinus tachycardia 101 bpm and no arrhythmia.


NM interval normal.


QRS duration normal.


No ST segment elevation


No ST segment depression. No changes consistent with acute ischemia. 





The patient with leukocytosis with white blood count of 15.8.  There is no other


severe electrolyte abnormalities.  With the patient initially arrived I did feel


that the patient was going to meet sepsis criteria however her lactic acid was 


normal.  I had already administered antibiotics which included vancomycin and 


cefepime.  Blood cultures and urine cultures have been obtained prior to 


arrival.  An indwelling Akbar catheter was placed as the patient is bedbound and


will require admission.  The patient was admitted in serious condition to the 


spitalist.  She will go to the medical surgical floor given that she is a DO NOT


RESUSCITATE with comfort measures only





Critical Care:


Time: 40 minutes


Treatments/Evaluations: Close monitoring and treatment of unstable vital signs, 


cardiorespiratory, and neurologic status, while maintaining tight balance of 


fluid, respiratory, and cardiac interventions.  Time does not include performing


any of the above billable procedures.





Departure


Diagnosis:  


   Primary Impression:  


   COPD exacerbation


Condition:  Serious











BILL BENOIT MD            Jan 12, 2019 11:39

## 2019-01-12 NOTE — HP
Date/Time of Note


Date/Time of Note


DATE: 1/12/19 


TIME: 13:45





Assessment/Plan


VTE Prophylaxis


Pharmacological prophylaxis:  LMWH





Lines/Catheters


IV Catheter Type (from [facility]):  Peripheral IV





Assessment/Plan


Hospital Course


87-year-old female with comorbidities including COPD, dementia, and 


hypothyroidism, who was brought in from an assisted living facility because of 


dyspnea with evidence of underlying sepsis and acute respiratory failure, who 


will be admitted to inpatient setting for further treatment and evaluation.





1.  Sepsis with leukocytosis and tachycardia, present on admission secondary to 


underlying urinary tract infection and suspected tracheobronchitis.


-Start empiric antibiotics including coverage for anaerobes since there is 


likelihood of aspiration pneumonitis.


-Send pan cultures.


-Judicious use of IV fluids.





2.  Acute respiratory failure.


-Hypoxic.


-Continue inhaled bronchodilators.


-Continue tapering dose of steroids.


-Continue supplemental oxygen.





3.  Suspected urinary tract infection.


-Continue empiric antimicrobials


-Await final cultures.





4.  Dementia.


-Frequent reorientation.


-Speech therapy for swallow evaluation before safely feeding the patient.





6.  Hypothyroidism.


-Resume Synthroid.


-Obtain thyroid studies.








Plan: The patient will be admitted to inpatient medical surgical floor. The 


patient will be kept n.p.o. until speech therapy evaluation.. The patient will 


be started on DVT prophylaxis and gastrointestinal prophylaxis (patient on 


steroids).  The patient will remain a DNR.  Activities will be bedrest.





The rest of the patient's management will be based on the clinical course and 


the results of diagnostic studies.





Based on the patient's clinical presentation, she most probably requires at leas


t 2 midnights' stay for further management and evaluation of her clinical 


presentation.





The patient was seen in collaboration with Dr. Swanson.


Result Diagram:  


1/12/19 0924 1/12/19 0924





Results 24hrs





Laboratory Tests


Test
                                1/12/19
09:24  1/12/19
09:37  1/12/19
11:34


White Blood Count                          15.8  H


Red Blood Count                             4.49


Hemoglobin                                  13.4


Hematocrit                                  42.3


Mean Corpuscular Volume                     94.2


Mean Corpuscular Hemoglobin                 29.8


Mean Corpuscular Hemoglobin
Concent       31.7  L
  
              



Red Cell Distribution Width                 13.7


Platelet Count                               277


Mean Platelet Volume                       11.4  H


Immature Granulocytes %                   1.000  H


Neutrophils %                              79.3  H


Lymphocytes %                              13.1  L


Monocytes %                                  6.1


Eosinophils %                                0.1


Basophils %                                  0.4


Nucleated Red Blood Cells %                  0.0


Immature Granulocytes #                   0.160  H


Neutrophils #                              12.5  H


Lymphocytes #                                2.1


Monocytes #                                 1.0  H


Eosinophils #                                0.0


Basophils #                                  0.1


Nucleated Red Blood Cells #                  0.0


Prothrombin Time                            13.1


Prothrombin Time Ratio                       1.0


INR International Normalized
Ratio         0.98  
  
              



Activated Partial
Thromboplast Time        29.1  
  
              



Sodium Level                                 140


Potassium Level                              4.1


Chloride Level                               101


Carbon Dioxide Level                         35  H


Anion Gap                                     4  L


Blood Urea Nitrogen                           16


Creatinine                                  0.83


Est Glomerular Filtrat Rate
mL/min     
            
              



Glucose Level                                152


Calcium Level                               10.2


Total Bilirubin                              0.4


Direct Bilirubin                            0.00


Indirect Bilirubin                           0.4


Aspartate Amino Transf
(AST/SGOT)            30  
  
              



Alanine Aminotransferase
(ALT/SGPT)          21  
  
              



Alkaline Phosphatase                        139  H


Troponin I                                 0.031


Total Protein                                7.8


Albumin                                      3.8


Globulin                                   4.00  H


Albumin/Globulin Ratio                      0.95


Amylase Level                                 36


Lipase                                       12  L


POC Venous Lactate                                          1.7


Urine Color                                                        ZAYDA


Urine Clarity                                                      CLOUDY  A


Urine pH                                                                   5.0


Urine Specific Gravity                                                   1.026


Urine Ketones                                                      NEGATIVE


Urine Nitrite                                                      POSITIVE  A


Urine Bilirubin                                                    NEGATIVE


Urine Urobilinogen                                                         1+  H


Urine Leukocyte Esterase                                           TRACE  A


Urine Microscopic RBC                                                        3


Urine Microscopic WBC                                                      61  H


Urine Squamous Epithelial
Cells      
              
              FEW  



Urine Bacteria                                                     FEW  A


Urine Hyaline Casts                                                FEW  A


Urine Granular Casts                                               FEW  A


Urine Mucus                                                        MANY  A


Urine Hemoglobin                                                   NEGATIVE


Urine Glucose                                                      NEGATIVE


Urine Total Protein                                                        2+  H








HPI/ROS


Admit Date/Time


Admit Date/Time


Jan 12, 2019 at 11:21





Hx of Present Illness


Reason for admission: Dyspnea, hypoxia.





This is a 87-year-old female who is a resident of a assisted living facility 


with known history of COPD, hypothyroidism, and dementia who was brought in by 


paramedics to the emergency room because of dyspnea.  The patient is a DO NOT 


RESUSCITATE.  Details of the patient's HPI was obtained by reading the patient's


documents from the assisted living facility as well as the ER physician's 


documents.  In the emergency room, the patient was noticed to have leukocytosis.


 The patient had no lactic acidosis.  The patient's urinalysis was positive with


a positive nitrate and positive leukocyte esterase with urine microscopic WBC of


61.  The patient's chest x-ray was negative for any acute findings.  The patient


was treated with IV cefepime and vancomycin along with IV Solu-Medrol in the 


emergency room.





ROS


Subjective hx not possible:  other (Patient confused.)





PMH/Family/Social


Past Medical History


1.  Hypothyroidism.


2.  Dementia.


3.  COPD.


Medications





Current Medications


Acetaminophen (Tylenol Tab) 650 mg ER BRIDGE PRN PO MILD PAIN(1-3)OR ELEVATED 


TEMP;  Start 1/12/19 at 11:30;  Stop 1/13/19 at 11:29


Sodium Chloride 1,000 ml @  60 mls/hr [id number] IV  Last administered on 1/12/19at


13:09; Admin Dose 60 MLS/HR;  Start 1/12/19 at 12:25


IV Flush (NS 3 ml) 3 ml PER PROTOCOL IV ;  Start 1/12/19 at 12:30


Ondansetron HCl (Zofran Inj) 4 mg Q6H  PRN IV NAUSEA AND/OR VOMITING;  Start 


1/12/19 at 12:30


Coded Allergies:  


     Sulfa (Sulfonamide Antibiotics) (Verified  Allergy, Unknown, 1/12/19)





Past Surgical History


Unable to obtain details of surgical history.





Social History


The patient currently lives in a board-and-care/assisted living facility.


Alcohol Use:  none


Smoking Status:  Former smoker


Drug Use:  none





Exam/Review of Systems


Vital Signs


Vitals





Vital Signs


  Date      Temp  Pulse  Resp  B/P (MAP)   Pulse Ox  O2          O2 Flow    FiO2


Time                                                 Delivery    Rate


   1/12/19  98.8    102    16      143/67        93  Nasal             3.0


     12:18                           (92)            Cannula








Exam


Exam


General: Adequately build 87 year-old female lying in bed in mild respiratory 


distress.


HEENT: Normocephalic, atraumatic. Eyes: Anicteric sclerae, conjunctivae clear. 


ENT: Nasal septum midline, oral mucosa is dry. Neck supple.


Respiratory: Bilaterally diminished breath sounds.  Use of accessory muscles of 


respiration.  Bilateral expiratory wheezing.


Cardiovascular: S1, S2 heard.  Regular rate and rhythm.


Abdomen: Soft, nontender, and nondistended. Bowel sounds positive in all 4 


quadrants.


Genitourinary: Akbar catheter in place draining straw-colored urine.


Extremities: No cyanosis, no clubbing, no edema. Peripheral pulses palpable.


Neurologic: The patient is awake and alert.  Oriented to self.  Does not follow 


commands.


Additional Comments


CXR


IMPRESSION:


Mild bibasilar atelectatic changes.


Calcified aorta consistent with atherosclerotic disease.











DANIEL CHEUNG NP               Jan 12, 2019 13:45

## 2019-01-13 VITALS — HEART RATE: 92 BPM | SYSTOLIC BLOOD PRESSURE: 139 MMHG | RESPIRATION RATE: 19 BRPM | DIASTOLIC BLOOD PRESSURE: 77 MMHG

## 2019-01-13 VITALS — DIASTOLIC BLOOD PRESSURE: 68 MMHG | HEART RATE: 94 BPM | RESPIRATION RATE: 18 BRPM | SYSTOLIC BLOOD PRESSURE: 126 MMHG

## 2019-01-13 VITALS — SYSTOLIC BLOOD PRESSURE: 118 MMHG | RESPIRATION RATE: 20 BRPM | DIASTOLIC BLOOD PRESSURE: 55 MMHG | HEART RATE: 100 BPM

## 2019-01-13 VITALS — RESPIRATION RATE: 18 BRPM | HEART RATE: 67 BPM | DIASTOLIC BLOOD PRESSURE: 70 MMHG | SYSTOLIC BLOOD PRESSURE: 155 MMHG

## 2019-01-13 VITALS — RESPIRATION RATE: 18 BRPM | HEART RATE: 72 BPM | DIASTOLIC BLOOD PRESSURE: 84 MMHG | SYSTOLIC BLOOD PRESSURE: 177 MMHG

## 2019-01-13 VITALS — SYSTOLIC BLOOD PRESSURE: 133 MMHG | DIASTOLIC BLOOD PRESSURE: 65 MMHG

## 2019-01-13 LAB
ADD MAN DIFF?: NO
ALANINE AMINOTRANSFERASE: 24 IU/L (ref 13–69)
ALBUMIN/GLOBULIN RATIO: 1
ALBUMIN: 3.1 G/DL (ref 3.3–4.9)
ALKALINE PHOSPHATASE: 97 IU/L (ref 42–121)
ANION GAP: 6 (ref 5–13)
ASPARTATE AMINO TRANSFERASE: 28 IU/L (ref 15–46)
BASOPHIL #: 0 10^3/UL (ref 0–0.1)
BASOPHILS %: 0.2 % (ref 0–2)
BILIRUBIN,DIRECT: 0 MG/DL (ref 0–0.2)
BILIRUBIN,TOTAL: 0.2 MG/DL (ref 0.2–1.3)
BLOOD UREA NITROGEN: 17 MG/DL (ref 7–20)
CALCIUM: 10 MG/DL (ref 8.4–10.2)
CARBON DIOXIDE: 31 MMOL/L (ref 21–31)
CHLORIDE: 108 MMOL/L (ref 97–110)
CREATININE: 0.8 MG/DL (ref 0.44–1)
EOSINOPHILS #: 0 10^3/UL (ref 0–0.5)
EOSINOPHILS %: 0 % (ref 0–7)
GLOBULIN: 3.1 G/DL (ref 1.3–3.2)
GLUCOSE: 138 MG/DL (ref 70–220)
HEMATOCRIT: 37 % (ref 37–47)
HEMOGLOBIN: 11.4 G/DL (ref 12–16)
IMMATURE GRANS #M: 0.18 10^3/UL (ref 0–0.03)
IMMATURE GRANS % (M): 1.4 % (ref 0–0.43)
LACTIC ACID: 1.5 MMOL/L (ref 0.5–2)
LYMPHOCYTES #: 1.7 10^3/UL (ref 0.8–2.9)
LYMPHOCYTES %: 12.8 % (ref 15–51)
MAGNESIUM: 2.3 MG/DL (ref 1.7–2.5)
MEAN CORPUSCULAR HEMOGLOBIN: 29.7 PG (ref 29–33)
MEAN CORPUSCULAR HGB CONC: 30.8 G/DL (ref 32–37)
MEAN CORPUSCULAR VOLUME: 96.4 FL (ref 82–101)
MEAN PLATELET VOLUME: 12.7 FL (ref 7.4–10.4)
MONOCYTE #: 0.4 10^3/UL (ref 0.3–0.9)
MONOCYTES %: 3.2 % (ref 0–11)
NEUTROPHIL #: 10.9 10^3/UL (ref 1.6–7.5)
NEUTROPHILS %: 82.4 % (ref 39–77)
NUCLEATED RED BLOOD CELLS #: 0 10^3/UL (ref 0–0)
NUCLEATED RED BLOOD CELLS%: 0 /100WBC (ref 0–0)
PHOSPHORUS: 4.6 MG/DL (ref 2.5–4.9)
PLATELET COUNT: 202 10^3/UL (ref 140–415)
POTASSIUM: 4.6 MMOL/L (ref 3.5–5.1)
RED BLOOD COUNT: 3.84 10^6/UL (ref 4.2–5.4)
RED CELL DISTRIBUTION WIDTH: 14.1 % (ref 11.5–14.5)
SODIUM: 145 MMOL/L (ref 135–144)
TOTAL PROTEIN: 6.2 G/DL (ref 6.1–8.1)
WHITE BLOOD COUNT: 13.2 10^3/UL (ref 4.8–10.8)

## 2019-01-13 RX ADMIN — IPRATROPIUM BROMIDE AND ALBUTEROL SULFATE SCH ML: .5; 3 SOLUTION RESPIRATORY (INHALATION) at 13:48

## 2019-01-13 RX ADMIN — ENOXAPARIN SODIUM 1 MG: 100 INJECTION SUBCUTANEOUS at 09:00

## 2019-01-13 RX ADMIN — PIPERACILLIN SODIUM AND TAZOBACTAM SODIUM 1 MLS/HR: 3; .375 INJECTION, POWDER, LYOPHILIZED, FOR SOLUTION INTRAVENOUS at 05:47

## 2019-01-13 RX ADMIN — LORATADINE 1 MG: 10 TABLET ORAL at 09:00

## 2019-01-13 RX ADMIN — METHYLPREDNISOLONE SODIUM SUCCINATE 1 MG: 125 INJECTION, POWDER, FOR SOLUTION INTRAMUSCULAR; INTRAVENOUS at 13:20

## 2019-01-13 RX ADMIN — THIAMINE HYDROCHLORIDE 1 MLS/HR: 100 INJECTION, SOLUTION INTRAMUSCULAR; INTRAVENOUS at 05:47

## 2019-01-13 RX ADMIN — PIPERACILLIN SODIUM AND TAZOBACTAM SODIUM SCH MLS/HR: 3; .375 INJECTION, POWDER, LYOPHILIZED, FOR SOLUTION INTRAVENOUS at 05:47

## 2019-01-13 RX ADMIN — PANTOPRAZOLE SODIUM 1 MG: 40 INJECTION, POWDER, FOR SOLUTION INTRAVENOUS at 05:47

## 2019-01-13 RX ADMIN — FOLIC ACID SCH MLS/HR: 5 INJECTION, SOLUTION INTRAMUSCULAR; INTRAVENOUS; SUBCUTANEOUS at 05:47

## 2019-01-13 RX ADMIN — IPRATROPIUM BROMIDE AND ALBUTEROL SULFATE SCH ML: .5; 3 SOLUTION RESPIRATORY (INHALATION) at 21:20

## 2019-01-13 RX ADMIN — AZITHROMYCIN MONOHYDRATE 1 MLS/HR: 500 INJECTION, POWDER, LYOPHILIZED, FOR SOLUTION INTRAVENOUS at 12:35

## 2019-01-13 RX ADMIN — THERA TABS SCH TAB: TAB at 09:00

## 2019-01-13 RX ADMIN — HYDRALAZINE HYDROCHLORIDE 1 MG: 20 INJECTION INTRAMUSCULAR; INTRAVENOUS at 07:59

## 2019-01-13 RX ADMIN — CEFTRIAXONE 1 MLS/HR: 1 INJECTION, SOLUTION INTRAVENOUS at 11:22

## 2019-01-13 RX ADMIN — CEFTRIAXONE SCH MLS/HR: 1 INJECTION, SOLUTION INTRAVENOUS at 11:22

## 2019-01-13 RX ADMIN — THERA TABS 1 TAB: TAB at 09:00

## 2019-01-13 RX ADMIN — IPRATROPIUM BROMIDE AND ALBUTEROL SULFATE 1 ML: .5; 3 SOLUTION RESPIRATORY (INHALATION) at 13:48

## 2019-01-13 RX ADMIN — IPRATROPIUM BROMIDE AND ALBUTEROL SULFATE SCH ML: .5; 3 SOLUTION RESPIRATORY (INHALATION) at 09:08

## 2019-01-13 RX ADMIN — LORATADINE SCH MG: 10 TABLET ORAL at 09:00

## 2019-01-13 RX ADMIN — ENOXAPARIN SODIUM SCH MG: 100 INJECTION SUBCUTANEOUS at 09:00

## 2019-01-13 RX ADMIN — METHYLPREDNISOLONE SODIUM SUCCINATE 1 MG: 125 INJECTION, POWDER, FOR SOLUTION INTRAMUSCULAR; INTRAVENOUS at 05:47

## 2019-01-13 RX ADMIN — PIPERACILLIN SODIUM AND TAZOBACTAM SODIUM 1 MLS/HR: 3; .375 INJECTION, POWDER, LYOPHILIZED, FOR SOLUTION INTRAVENOUS at 00:10

## 2019-01-13 RX ADMIN — METHYLPREDNISOLONE SODIUM SUCCINATE 1 MG: 125 INJECTION, POWDER, FOR SOLUTION INTRAMUSCULAR; INTRAVENOUS at 21:14

## 2019-01-13 RX ADMIN — ASPIRIN 1 MG: 81 TABLET, COATED ORAL at 09:00

## 2019-01-13 RX ADMIN — PIPERACILLIN SODIUM AND TAZOBACTAM SODIUM SCH MLS/HR: 3; .375 INJECTION, POWDER, LYOPHILIZED, FOR SOLUTION INTRAVENOUS at 00:10

## 2019-01-13 RX ADMIN — OLANZAPINE 1 MG: 2.5 TABLET, FILM COATED ORAL at 13:20

## 2019-01-13 RX ADMIN — OLANZAPINE SCH MG: 2.5 TABLET, FILM COATED ORAL at 13:20

## 2019-01-13 RX ADMIN — LEVOTHYROXINE SODIUM 1 MCG: 125 TABLET ORAL at 05:48

## 2019-01-13 RX ADMIN — IPRATROPIUM BROMIDE AND ALBUTEROL SULFATE 1 ML: .5; 3 SOLUTION RESPIRATORY (INHALATION) at 21:20

## 2019-01-13 RX ADMIN — ASPIRIN SCH MG: 81 TABLET, COATED ORAL at 09:00

## 2019-01-13 RX ADMIN — IPRATROPIUM BROMIDE AND ALBUTEROL SULFATE 1 ML: .5; 3 SOLUTION RESPIRATORY (INHALATION) at 09:08

## 2019-01-13 RX ADMIN — PANTOPRAZOLE SODIUM SCH MG: 40 INJECTION, POWDER, FOR SOLUTION INTRAVENOUS at 05:47

## 2019-01-13 NOTE — PN
Date/Time of Note


Date/Time of Note


DATE: 1/13/19 


TIME: 10:41





Assessment/Plan


VTE Prophylaxis


Risk score (from Ns)>0 risk:  4


SCD applied (from [facility]):  No


SCD contraindicated:  patient refusal


Pharmacological prophylaxis:  LMWH (Patient refusing.)





Lines/Catheters


IV Catheter Type (from [facility]):  Peripheral IV


Urinary Cath still in place:  Yes


Reason Cath still needed:  other (indicate)





Assessment/Plan


Hospital Course


SUBJECTIVE:


The patient has been refusing all of her oral medications.  The patient had a 


one-to-one safety sitter last night because of confusion and impulsive behavior.


 The patient was given 2 doses of Haldol and 1 dose of Ativan last night because


of agitation.





OBJECTIVE:


Physical Exam


General: Adequately build 87 year-old female lying in bed in mild respiratory 


distress.


HEENT: Normocephalic, atraumatic. Eyes: Anicteric sclerae, conjunctivae clear. 


ENT: Nasal septum midline, oral mucosa is dry. Neck supple.


Respiratory: Bilaterally diminished breath sounds.  Use of accessory muscles of 


respiration.  Bilateral expiratory wheezing.


Cardiovascular: S1, S2 heard.  Regular rate and rhythm.


Abdomen: Soft, nontender, and nondistended. Bowel sounds positive in all 4 


quadrants.


Genitourinary: Akbar catheter in place draining straw-colored urine.


Extremities: No cyanosis, no clubbing, no edema. Peripheral pulses palpable.


Neurologic: The patient is awake and alert.  Oriented to self.  Does not follow 


commands.





Labs & Vitals per chart





ASSESSMENT & PLAN





87-year-old female with comorbidities including COPD, dementia, and 


hypothyroidism, who was brought in from an assisted living facility because of 


dyspnea with evidence of underlying sepsis and acute respiratory failure, who 


will be admitted to inpatient setting for further treatment and evaluation.





1.  Sepsis with leukocytosis and tachycardia, present on admission secondary to 


underlying suspected urinary tract infection and suspected tracheobronchitis.


-Was started on antibiotics including coverage for anaerobes since there is 


likelihood of aspiration pneumonitis.


-Pancultures negative so far.


-Judicious use of IV fluids.





2.  Acute respiratory failure.


-Hypoxic.


-Probably secondary to underlying COPD exacerbation.


-Continue inhaled bronchodilators.


-Continue tapering dose of steroids.


-Continue supplemental oxygen.





3.  Suspected urinary tract infection.


-Urine cultures negative.





4.  Dementia.


-Frequent reorientation.


-PRN anxiolytics/antipsychotics.





5.  Hypothyroidism.


-Continue Synthroid.


-Decrease the dosing.





6.  Dysphagia.


-Status post placed therapy evaluation.


-Aspiration precautions.


-Pured diet.





7.  Fluids, electrolytes, and nutrition.


-Pured diet.





8.  DVT prophylaxis.


-Subcutaneous Lovenox (patient refusing).





9.  Plan.


-De-escalate antimicrobials.


-Continue inhaled bronchodilators and tapering dose of steroids.


-Await clinical improvement.


-PT evaluation.





The patient was seen in collaboration with Dr. Swanson.


Result Diagram:  


1/13/19 0424                                                                    


           1/13/19 0424





Results 24hrs





Laboratory Tests


Test
                 1/12/19
11:34  1/12/19
13:58  1/12/19
14:00  1/13/19
04:24


Urine Color           ZAYDA


Urine Clarity         CLOUDY  A


Urine pH                      5.0


Urine Specific              1.026


Gravity


Urine Ketones         NEGATIVE


Urine Nitrite         POSITIVE  A


Urine Bilirubin       NEGATIVE


Urine Urobilinogen            1+  H


Urine Leukocyte       TRACE  A


Esterase


Urine Microscopic               3


RBC


Urine Microscopic             61  H


WBC


Urine Squamous        FEW  
         
              
              



Epithelial
Cells


Urine Bacteria        FEW  A


Urine Hyaline Casts   FEW  A


Urine Granular Casts  FEW  A


Urine Mucus           MANY  A


Urine Hemoglobin      NEGATIVE


Urine Glucose         NEGATIVE


Urine Total Protein           2+  H


B-Type Natriuretic                         4900  H


Peptide


Thyroid Stimulating   
                   0.769  
  
              



Hormone
(TSH)


Free Thyroxine                             2.01  H


Lactic Acid Level                                         2.5  *H          1.5


White Blood Count                                                        13.2  H


Red Blood Count                                                          3.84  L


Hemoglobin                                                               11.4  L


Hematocrit                                                                37.0


Mean Corpuscular                                                          96.4


Volume


Mean Corpuscular                                                          29.7


Hemoglobin


Mean Corpuscular      
              
              
                   30.8  L



Hemoglobin
Concent


Red Cell                                                                  14.1


Distribution Width


Platelet Count                                                            202  #


Mean Platelet Volume                                                     12.7  H


Immature                                                                1.400  H


Granulocytes %


Neutrophils %                                                            82.4  H


Lymphocytes %                                                            12.8  L


Monocytes %                                                                3.2


Eosinophils %                                                              0.0


Basophils %                                                                0.2


Nucleated Red Blood                                                        0.0


Cells %


Immature                                                                0.180  H


Granulocytes #


Neutrophils #                                                            10.9  H


Lymphocytes #                                                              1.7


Monocytes #                                                                0.4


Eosinophils #                                                              0.0


Basophils #                                                                0.0


Nucleated Red Blood                                                        0.0


Cells #


Sodium Level                                                              145  H


Potassium Level                                                            4.6


Chloride Level                                                             108


Carbon Dioxide Level                                                        31


Anion Gap                                                                    6


Blood Urea Nitrogen                                                         17


Creatinine                                                                0.80


Est Glomerular        
              
              
                



Filtrat Rate
mL/min


Glucose Level                                                              138


Calcium Level                                                             10.0


Phosphorus Level                                                           4.6


Magnesium Level                                                            2.3


Total Bilirubin                                                            0.2


Direct Bilirubin                                                          0.00


Indirect Bilirubin                                                         0.2


Aspartate Amino       
              
              
                      28  



Transf
(AST/SGOT)


Alanine               
              
              
                      24  



Aminotransferase
(AL


T/SGPT)


Alkaline Phosphatase                                                        97


Total Protein                                                             6.2  #


Albumin                                                                   3.1  L


Globulin                                                                  3.10


Albumin/Globulin                                                          1.00


Ratio








Exam/Review of Systems


Vital Signs


Vitals





Vital Signs


  Date      Temp  Pulse  Resp  B/P (MAP)   Pulse Ox  O2          O2 Flow    FiO2


Time                                                 Delivery    Rate


   1/13/19          103    28                    92  Nasal             3.0


     09:11                                           Cannula


   1/13/19                         133/65


     08:48                           (87)


   1/13/19  97.7


     07:43








Intake and Output





1/12/19 1/12/19 1/13/19





1515:00


23:00


07:00





IntakeIntake Total


250 ml


420 ml


980 ml





OutputOutput Total


350 ml





BalanceBalance


250 ml


420 ml


630 ml














Medications


Medications





Current Medications


Acetaminophen (Tylenol Tab) 650 mg ER BRIDGE PRN PO MILD PAIN(1-3)OR ELEVATED 


TEMP;  Start 1/12/19 at 11:30;  Stop 1/13/19 at 11:29


Sodium Chloride 1,000 ml @  60 mls/hr [id number] IV  Last administered on 1/13/19at


05:47; Admin Dose 60 MLS/HR;  Start 1/12/19 at 12:25


IV Flush (NS 3 ml) 3 ml PER PROTOCOL IV ;  Start 1/12/19 at 12:30


Ondansetron HCl (Zofran Inj) 4 mg Q6H  PRN IV NAUSEA AND/OR VOMITING;  Start 


1/12/19 at 12:30


Piperacillin Sod/ Tazobactam Sod 100 ml @  200 mls/hr Q6 IVPB  Last administered


on 1/13/19at 05:47; Admin Dose 200 MLS/HR;  Start 1/12/19 at 18:00


Vancomycin HCl (Vanco Iv Per Pharmacy) VANCOMYCIN PER PHARMACY PER PROTOCOL XX ;


 Start 1/12/19 at 14:00


Hydralazine HCl (Apresoline) 10 mg Q6H  PRN IV SBP>160 Last administered on 


1/13/19at 07:59; Admin Dose 10 MG;  Start 1/12/19 at 14:00


Aspirin (Halfprin) 81 mg DAILY PO ;  Start 1/13/19 at 09:00


Levothyroxine Sodium (Synthroid) 125 mcg BEFORE  BREAKFAST PO ;  Start 1/13/19 


at 07:00


Loratadine (Claritin) 10 mg DAILY PO ;  Start 1/13/19 at 09:00


Multivitamins Therapeutic (Theragran) 1 tab DAILY PO ;  Start 1/13/19 at 09:00


Methylprednisolone Sodium Succinate (Solu-Medrol) 60 mg Q8 IV  Last administered


on 1/13/19at 05:47; Admin Dose 60 MG;  Start 1/12/19 at 14:00


Pantoprazole (Protonix Iv) 40 mg DAILY@06 IV  Last administered on 1/13/19at 


05:47; Admin Dose 40 MG;  Start 1/13/19 at 06:00


Enoxaparin Sodium (Lovenox) 40 mg DAILY SC ;  Start 1/13/19 at 09:00


Albuterol/ Ipratropium (Duoneb) 3 ml Q6HWA RESP  THERAPY HHN  Last administered 


on 1/13/19at 09:08; Admin Dose 3 ML;  Start 1/12/19 at 20:00


Albuterol/ Ipratropium (Duoneb) 3 ml Q2H RESP THERAPY  PRN HHN SHORTNESS OF 


BREATH;  Start 1/12/19 at 14:30


Vancomycin HCl 1.25 gm/Sodium Chloride 250 ml @  83.333 mls/ hr Q24H IVPB ;  


Start 1/13/19 at 15:00











DANIEL CHEUNG NP               Jan 13, 2019 10:48

## 2019-01-14 VITALS — DIASTOLIC BLOOD PRESSURE: 74 MMHG | RESPIRATION RATE: 18 BRPM | SYSTOLIC BLOOD PRESSURE: 152 MMHG | HEART RATE: 78 BPM

## 2019-01-14 VITALS — RESPIRATION RATE: 18 BRPM | DIASTOLIC BLOOD PRESSURE: 76 MMHG | SYSTOLIC BLOOD PRESSURE: 154 MMHG | HEART RATE: 76 BPM

## 2019-01-14 VITALS — HEART RATE: 83 BPM | RESPIRATION RATE: 18 BRPM | SYSTOLIC BLOOD PRESSURE: 152 MMHG | DIASTOLIC BLOOD PRESSURE: 72 MMHG

## 2019-01-14 VITALS — SYSTOLIC BLOOD PRESSURE: 153 MMHG | RESPIRATION RATE: 18 BRPM | HEART RATE: 90 BPM | DIASTOLIC BLOOD PRESSURE: 82 MMHG

## 2019-01-14 VITALS — DIASTOLIC BLOOD PRESSURE: 78 MMHG | HEART RATE: 80 BPM | RESPIRATION RATE: 17 BRPM | SYSTOLIC BLOOD PRESSURE: 158 MMHG

## 2019-01-14 VITALS — DIASTOLIC BLOOD PRESSURE: 73 MMHG | RESPIRATION RATE: 18 BRPM | HEART RATE: 92 BPM | SYSTOLIC BLOOD PRESSURE: 141 MMHG

## 2019-01-14 LAB
ADD MAN DIFF?: NO
ALANINE AMINOTRANSFERASE: 28 IU/L (ref 13–69)
ALBUMIN/GLOBULIN RATIO: 0.96
ALBUMIN: 3.2 G/DL (ref 3.3–4.9)
ALKALINE PHOSPHATASE: 91 IU/L (ref 42–121)
ANION GAP: 4 (ref 5–13)
ASPARTATE AMINO TRANSFERASE: 29 IU/L (ref 15–46)
BASOPHIL #: 0.1 10^3/UL (ref 0–0.1)
BASOPHILS %: 0.5 % (ref 0–2)
BILIRUBIN,DIRECT: 0 MG/DL (ref 0–0.2)
BILIRUBIN,TOTAL: 0 MG/DL (ref 0.2–1.3)
BLOOD UREA NITROGEN: 30 MG/DL (ref 7–20)
CALCIUM: 10 MG/DL (ref 8.4–10.2)
CARBON DIOXIDE: 29 MMOL/L (ref 21–31)
CHLORIDE: 112 MMOL/L (ref 97–110)
CREATININE: 0.93 MG/DL (ref 0.44–1)
EOSINOPHILS #: 0 10^3/UL (ref 0–0.5)
EOSINOPHILS %: 0 % (ref 0–7)
GLOBULIN: 3.3 G/DL (ref 1.3–3.2)
GLUCOSE: 137 MG/DL (ref 70–220)
HEMATOCRIT: 37.4 % (ref 37–47)
HEMOGLOBIN: 11.6 G/DL (ref 12–16)
IMMATURE GRANS #M: 0.63 10^3/UL (ref 0–0.03)
IMMATURE GRANS % (M): 4.7 % (ref 0–0.43)
LYMPHOCYTES #: 1.1 10^3/UL (ref 0.8–2.9)
LYMPHOCYTES %: 8.3 % (ref 15–51)
MAGNESIUM: 2.4 MG/DL (ref 1.7–2.5)
MEAN CORPUSCULAR HEMOGLOBIN: 29.5 PG (ref 29–33)
MEAN CORPUSCULAR HGB CONC: 31 G/DL (ref 32–37)
MEAN CORPUSCULAR VOLUME: 95.2 FL (ref 82–101)
MEAN PLATELET VOLUME: 12.4 FL (ref 7.4–10.4)
MONOCYTE #: 0.4 10^3/UL (ref 0.3–0.9)
MONOCYTES %: 3.3 % (ref 0–11)
NEUTROPHIL #: 11.2 10^3/UL (ref 1.6–7.5)
NEUTROPHILS %: 83.2 % (ref 39–77)
NUCLEATED RED BLOOD CELLS #: 0 10^3/UL (ref 0–0)
NUCLEATED RED BLOOD CELLS%: 0 /100WBC (ref 0–0)
PHOSPHORUS: 4 MG/DL (ref 2.5–4.9)
PLATELET COUNT: 213 10^3/UL (ref 140–415)
POTASSIUM: 4.2 MMOL/L (ref 3.5–5.1)
RED BLOOD COUNT: 3.93 10^6/UL (ref 4.2–5.4)
RED CELL DISTRIBUTION WIDTH: 14.2 % (ref 11.5–14.5)
SODIUM: 145 MMOL/L (ref 135–144)
TOTAL PROTEIN: 6.5 G/DL (ref 6.1–8.1)
WHITE BLOOD COUNT: 13.5 10^3/UL (ref 4.8–10.8)

## 2019-01-14 RX ADMIN — OLANZAPINE 1 MG: 2.5 TABLET, FILM COATED ORAL at 09:36

## 2019-01-14 RX ADMIN — ASCORBIC ACID, VITAMIN A PALMITATE, CHOLECALCIFEROL, THIAMINE HYDROCHLORIDE, RIBOFLAVIN-5 PHOSPHATE SODIUM, PYRIDOXINE HYDROCHLORIDE, NIACINAMIDE, DEXPANTHENOL, ALPHA-TOCOPHEROL ACETATE, VITAMIN K1, FOLIC ACID, BIOTIN, CYANOCOBALAMIN 1 MLS/HR: 200; 3300; 200; 6; 3.6; 6; 40; 15; 10; 150; 600; 60; 5 INJECTION, SOLUTION INTRAVENOUS at 11:35

## 2019-01-14 RX ADMIN — CEFTRIAXONE SCH MLS/HR: 1 INJECTION, SOLUTION INTRAVENOUS at 11:35

## 2019-01-14 RX ADMIN — ENOXAPARIN SODIUM 1 MG: 100 INJECTION SUBCUTANEOUS at 09:36

## 2019-01-14 RX ADMIN — PANTOPRAZOLE SODIUM SCH MG: 40 INJECTION, POWDER, FOR SOLUTION INTRAVENOUS at 05:53

## 2019-01-14 RX ADMIN — IPRATROPIUM BROMIDE AND ALBUTEROL SULFATE 1 ML: .5; 3 SOLUTION RESPIRATORY (INHALATION) at 20:53

## 2019-01-14 RX ADMIN — LEVOTHYROXINE SODIUM SCH MCG: 88 TABLET ORAL at 05:55

## 2019-01-14 RX ADMIN — ASPIRIN SCH MG: 81 TABLET, COATED ORAL at 09:35

## 2019-01-14 RX ADMIN — PANTOPRAZOLE SODIUM 1 MG: 40 INJECTION, POWDER, FOR SOLUTION INTRAVENOUS at 05:53

## 2019-01-14 RX ADMIN — IPRATROPIUM BROMIDE AND ALBUTEROL SULFATE 1 ML: .5; 3 SOLUTION RESPIRATORY (INHALATION) at 03:58

## 2019-01-14 RX ADMIN — BENZONATATE 1 MG: 100 CAPSULE ORAL at 17:17

## 2019-01-14 RX ADMIN — IPRATROPIUM BROMIDE AND ALBUTEROL SULFATE SCH ML: .5; 3 SOLUTION RESPIRATORY (INHALATION) at 16:21

## 2019-01-14 RX ADMIN — IPRATROPIUM BROMIDE AND ALBUTEROL SULFATE PRN ML: .5; 3 SOLUTION RESPIRATORY (INHALATION) at 20:53

## 2019-01-14 RX ADMIN — QUETIAPINE FUMARATE PRN MG: 25 TABLET, FILM COATED ORAL at 20:10

## 2019-01-14 RX ADMIN — LORATADINE SCH MG: 10 TABLET ORAL at 09:35

## 2019-01-14 RX ADMIN — THERA TABS 1 TAB: TAB at 09:36

## 2019-01-14 RX ADMIN — DOCUSATE SODIUM SCH MG: 100 CAPSULE, LIQUID FILLED ORAL at 20:10

## 2019-01-14 RX ADMIN — LORATADINE 1 MG: 10 TABLET ORAL at 09:35

## 2019-01-14 RX ADMIN — QUETIAPINE FUMARATE 1 MG: 25 TABLET ORAL at 20:10

## 2019-01-14 RX ADMIN — CEFTRIAXONE 1 MLS/HR: 1 INJECTION, SOLUTION INTRAVENOUS at 11:35

## 2019-01-14 RX ADMIN — IPRATROPIUM BROMIDE AND ALBUTEROL SULFATE 1 ML: .5; 3 SOLUTION RESPIRATORY (INHALATION) at 16:21

## 2019-01-14 RX ADMIN — IPRATROPIUM BROMIDE AND ALBUTEROL SULFATE SCH ML: .5; 3 SOLUTION RESPIRATORY (INHALATION) at 07:28

## 2019-01-14 RX ADMIN — DOCUSATE SODIUM 1 MG: 100 CAPSULE, LIQUID FILLED ORAL at 20:10

## 2019-01-14 RX ADMIN — IPRATROPIUM BROMIDE AND ALBUTEROL SULFATE 1 ML: .5; 3 SOLUTION RESPIRATORY (INHALATION) at 07:28

## 2019-01-14 RX ADMIN — LORAZEPAM 1 MG: 2 INJECTION, SOLUTION INTRAMUSCULAR; INTRAVENOUS at 01:56

## 2019-01-14 RX ADMIN — ENOXAPARIN SODIUM SCH MG: 100 INJECTION SUBCUTANEOUS at 09:36

## 2019-01-14 RX ADMIN — IPRATROPIUM BROMIDE AND ALBUTEROL SULFATE PRN ML: .5; 3 SOLUTION RESPIRATORY (INHALATION) at 03:58

## 2019-01-14 RX ADMIN — METHYLPREDNISOLONE SODIUM SUCCINATE 1 MG: 125 INJECTION, POWDER, FOR SOLUTION INTRAMUSCULAR; INTRAVENOUS at 05:54

## 2019-01-14 RX ADMIN — AZITHROMYCIN 1 MG: 250 TABLET, FILM COATED ORAL at 16:50

## 2019-01-14 RX ADMIN — OLANZAPINE SCH MG: 2.5 TABLET, FILM COATED ORAL at 09:36

## 2019-01-14 RX ADMIN — LEVOTHYROXINE SODIUM 1 MCG: 88 TABLET ORAL at 05:55

## 2019-01-14 RX ADMIN — HALOPERIDOL LACTATE 1 MG: 5 INJECTION, SOLUTION INTRAMUSCULAR at 01:55

## 2019-01-14 RX ADMIN — THERA TABS SCH TAB: TAB at 09:36

## 2019-01-14 RX ADMIN — ASPIRIN 1 MG: 81 TABLET, COATED ORAL at 09:35

## 2019-01-14 RX ADMIN — BENZONATATE PRN MG: 100 CAPSULE ORAL at 17:17

## 2019-01-14 RX ADMIN — AZITHROMYCIN SCH MG: 250 TABLET, FILM COATED ORAL at 16:50

## 2019-01-14 NOTE — PN
Date/Time of Note


Date/Time of Note


DATE: 1/14/19 


TIME: 10:20





Assessment/Plan


VTE Prophylaxis


Risk score (from Nsg)>0 risk:  4


SCD applied (from Nsg):  Yes


SCD contraindicated:  low risk/ambulating


Pharmacological prophylaxis:  LMWH





Lines/Catheters


IV Catheter Type (from Nrsg):  Peripheral IV


Urinary Cath still in place:  Yes


Reason Cath still needed:  other (indicate) (Bedbound delirium fall risk at 


present)





Assessment/Plan


Hospital Course





Assessment and plan


1.  Acute hypoxic respiratory failure, stable Taper O2 as possible


2.  Bronchitis versus pneumonia versus viral illness, check x-ray/influenza


3.  Dementia with delirium


4.  Failure to thrive back to assisted living versus sniff


5.  Chronic hypothyroidism


6.  Chronic COPD


7.  Abn EKG: poor R through septal leads, asymptomatic, continue medical 


management 





S: Less pulmonary distress. Delirium and anxiety overnight.  Will need family at


bed side during daytime.  Discontinue O2/Akbar when stable





Objective: Vital signs stable except hypoxia





Physical exam


No pallor JVD


Regular no murmur rub gallop


Diminished breath sounds at bases no tachypnea


Bs+ nt nd; no RRG


No edemaHomans


Result Diagram:  


1/13/19 0424                                                                    


           1/13/19 0424





Results 24hrs





Laboratory Tests


               Test
                                1/14/19
10:11


               White Blood Count                    Pending


               Red Blood Count                      Pending


               Hemoglobin                           Pending


               Hematocrit                           Pending


               Mean Corpuscular Volume              Pending


               Mean Corpuscular Hemoglobin          Pending


               Mean Corpuscular Hemoglobin
Concent  Pending  



               Red Cell Distribution Width          Pending


               Platelet Count                       Pending


               Mean Platelet Volume                 Pending








Exam/Review of Systems


Vital Signs


Vitals





Vital Signs


  Date      Temp  Pulse  Resp  B/P (MAP)   Pulse Ox  O2          O2 Flow    FiO2


Time                                                 Delivery    Rate


   1/14/19  98.3     92    18      141/73        98  Nasal             3.0


     09:31                           (95)            Cannula








Intake and Output





1/13/19 1/13/19 1/14/19





1515:00


23:00


07:00





IntakeIntake Total


780 ml


120 ml





OutputOutput Total


350 ml





BalanceBalance


780 ml


-230 ml














Medications


Medications





Current Medications


IV Flush (NS 3 ml) 3 ml PER PROTOCOL IV ;  Start 1/12/19 at 12:30


Ondansetron HCl (Zofran Inj) 4 mg Q6H  PRN IV NAUSEA AND/OR VOMITING;  Start 


1/12/19 at 12:30


Hydralazine HCl (Apresoline) 10 mg Q6H  PRN IV SBP>160 Last administered on 


1/13/19at 07:59; Admin Dose 10 MG;  Start 1/12/19 at 14:00


Aspirin (Halfprin) 81 mg DAILY PO  Last administered on 1/14/19at 09:35; Admin 


Dose 81 MG;  Start 1/13/19 at 09:00


Loratadine (Claritin) 10 mg DAILY PO  Last administered on 1/14/19at 09:35; 


Admin Dose 10 MG;  Start 1/13/19 at 09:00


Multivitamins Therapeutic (Theragran) 1 tab DAILY PO  Last administered on 


1/14/19at 09:36; Admin Dose 1 TAB;  Start 1/13/19 at 09:00


Methylprednisolone Sodium Succinate (Solu-Medrol) 60 mg Q8 IV  Last administered


on 1/14/19at 05:54; Admin Dose 60 MG;  Start 1/12/19 at 14:00


Pantoprazole (Protonix Iv) 40 mg DAILY@06 IV  Last administered on 1/14/19at 


05:53; Admin Dose 40 MG;  Start 1/13/19 at 06:00


Enoxaparin Sodium (Lovenox) 40 mg DAILY SC  Last administered on 1/14/19at 


09:36; Admin Dose 40 MG;  Start 1/13/19 at 09:00


Albuterol/ Ipratropium (Duoneb) 3 ml Q6HWA RESP  THERAPY HHN  Last administered 


on 1/14/19at 07:28; Admin Dose 3 ML;  Start 1/12/19 at 20:00


Albuterol/ Ipratropium (Duoneb) 3 ml Q2H RESP THERAPY  PRN HHN SHORTNESS OF 


BREATH Last administered on 1/14/19at 03:58; Admin Dose 3 ML;  Start 1/12/19 at 


14:30


Levothyroxine Sodium (Synthroid) 88 mcg DAILY@06 PO ;  Start 1/14/19 at 06:00


Ceftriaxone Sodium 50 ml @  100 mls/hr Q24H IVPB  Last administered on 1/13/19at


11:22; Admin Dose 100 MLS/HR;  Start 1/13/19 at 11:00


Azithromycin 250 ml @  250 mls/hr Q24H IVPB  Last administered on 1/13/19at 


12:35; Admin Dose 250 MLS/HR;  Start 1/13/19 at 12:00


Olanzapine (Zyprexa) 2.5 mg DAILY PO  Last administered on 1/14/19at 09:36; 


Admin Dose 2.5 MG;  Start 1/13/19 at 12:00


Benzonatate (Tessalon) 100 mg TID  PRN PO COUGH;  Start 1/14/19 at 04:30











FRANNY LEON MD         Jan 14, 2019 10:23

## 2019-01-15 VITALS — DIASTOLIC BLOOD PRESSURE: 86 MMHG | SYSTOLIC BLOOD PRESSURE: 126 MMHG | HEART RATE: 93 BPM | RESPIRATION RATE: 18 BRPM

## 2019-01-15 VITALS — DIASTOLIC BLOOD PRESSURE: 80 MMHG | SYSTOLIC BLOOD PRESSURE: 140 MMHG | RESPIRATION RATE: 18 BRPM | HEART RATE: 98 BPM

## 2019-01-15 VITALS — SYSTOLIC BLOOD PRESSURE: 158 MMHG | HEART RATE: 82 BPM | DIASTOLIC BLOOD PRESSURE: 76 MMHG | RESPIRATION RATE: 18 BRPM

## 2019-01-15 VITALS — RESPIRATION RATE: 18 BRPM | SYSTOLIC BLOOD PRESSURE: 148 MMHG | DIASTOLIC BLOOD PRESSURE: 86 MMHG

## 2019-01-15 LAB
ADD MAN DIFF?: NO
ANION GAP: 7 (ref 5–13)
BASOPHIL #: 0.1 10^3/UL (ref 0–0.1)
BASOPHILS %: 0.5 % (ref 0–2)
BLOOD UREA NITROGEN: 29 MG/DL (ref 7–20)
CALCIUM: 10 MG/DL (ref 8.4–10.2)
CARBON DIOXIDE: 31 MMOL/L (ref 21–31)
CHLORIDE: 110 MMOL/L (ref 97–110)
CREATININE: 0.89 MG/DL (ref 0.44–1)
EOSINOPHILS #: 0.1 10^3/UL (ref 0–0.5)
EOSINOPHILS %: 0.4 % (ref 0–7)
GLUCOSE: 85 MG/DL (ref 70–220)
HEMATOCRIT: 38.4 % (ref 37–47)
HEMOGLOBIN: 12.4 G/DL (ref 12–16)
IMMATURE GRANS #M: 0.43 10^3/UL (ref 0–0.03)
IMMATURE GRANS % (M): 2.7 % (ref 0–0.43)
LYMPHOCYTES #: 3.1 10^3/UL (ref 0.8–2.9)
LYMPHOCYTES %: 19.9 % (ref 15–51)
MAGNESIUM: 2.5 MG/DL (ref 1.7–2.5)
MEAN CORPUSCULAR HEMOGLOBIN: 30.5 PG (ref 29–33)
MEAN CORPUSCULAR HGB CONC: 32.3 G/DL (ref 32–37)
MEAN CORPUSCULAR VOLUME: 94.6 FL (ref 82–101)
MEAN PLATELET VOLUME: 12.4 FL (ref 7.4–10.4)
MONOCYTE #: 1 10^3/UL (ref 0.3–0.9)
MONOCYTES %: 6.5 % (ref 0–11)
NEUTROPHIL #: 11.1 10^3/UL (ref 1.6–7.5)
NEUTROPHILS %: 70 % (ref 39–77)
NUCLEATED RED BLOOD CELLS #: 0 10^3/UL (ref 0–0)
NUCLEATED RED BLOOD CELLS%: 0 /100WBC (ref 0–0)
PLATELET COUNT: 233 10^3/UL (ref 140–415)
POTASSIUM: 3.9 MMOL/L (ref 3.5–5.1)
RED BLOOD COUNT: 4.06 10^6/UL (ref 4.2–5.4)
RED CELL DISTRIBUTION WIDTH: 14.6 % (ref 11.5–14.5)
SODIUM: 148 MMOL/L (ref 135–144)
TROPONIN-I: 0.04 NG/ML (ref 0–0.12)
WHITE BLOOD COUNT: 15.8 10^3/UL (ref 4.8–10.8)

## 2019-01-15 RX ADMIN — ASPIRIN 1 MG: 81 TABLET, COATED ORAL at 09:10

## 2019-01-15 RX ADMIN — ACETYLCYSTEINE 1 ML: 200 SOLUTION ORAL; RESPIRATORY (INHALATION) at 14:00

## 2019-01-15 RX ADMIN — IPRATROPIUM BROMIDE AND ALBUTEROL SULFATE SCH ML: .5; 3 SOLUTION RESPIRATORY (INHALATION) at 00:45

## 2019-01-15 RX ADMIN — CEFTRIAXONE SCH MLS/HR: 1 INJECTION, SOLUTION INTRAVENOUS at 12:09

## 2019-01-15 RX ADMIN — AZITHROMYCIN SCH MG: 250 TABLET, FILM COATED ORAL at 09:10

## 2019-01-15 RX ADMIN — ACETYLCYSTEINE SCH ML: 200 SOLUTION ORAL; RESPIRATORY (INHALATION) at 14:00

## 2019-01-15 RX ADMIN — LEVOTHYROXINE SODIUM 1 MCG: 88 TABLET ORAL at 06:04

## 2019-01-15 RX ADMIN — ENOXAPARIN SODIUM 1 MG: 100 INJECTION SUBCUTANEOUS at 09:12

## 2019-01-15 RX ADMIN — IPRATROPIUM BROMIDE AND ALBUTEROL SULFATE 1 ML: .5; 3 SOLUTION RESPIRATORY (INHALATION) at 16:00

## 2019-01-15 RX ADMIN — BENZONATATE 1 MG: 100 CAPSULE ORAL at 01:55

## 2019-01-15 RX ADMIN — OLANZAPINE 1 MG: 2.5 TABLET, FILM COATED ORAL at 09:11

## 2019-01-15 RX ADMIN — IPRATROPIUM BROMIDE AND ALBUTEROL SULFATE 1 ML: .5; 3 SOLUTION RESPIRATORY (INHALATION) at 21:06

## 2019-01-15 RX ADMIN — BENZONATATE PRN MG: 100 CAPSULE ORAL at 21:12

## 2019-01-15 RX ADMIN — IPRATROPIUM BROMIDE AND ALBUTEROL SULFATE 1 ML: .5; 3 SOLUTION RESPIRATORY (INHALATION) at 00:45

## 2019-01-15 RX ADMIN — BENZONATATE PRN MG: 100 CAPSULE ORAL at 01:55

## 2019-01-15 RX ADMIN — DOCUSATE SODIUM 1 MG: 100 CAPSULE, LIQUID FILLED ORAL at 21:12

## 2019-01-15 RX ADMIN — AZITHROMYCIN 1 MG: 250 TABLET, FILM COATED ORAL at 09:10

## 2019-01-15 RX ADMIN — DOCUSATE SODIUM SCH MG: 100 CAPSULE, LIQUID FILLED ORAL at 21:12

## 2019-01-15 RX ADMIN — ENOXAPARIN SODIUM SCH MG: 100 INJECTION SUBCUTANEOUS at 09:12

## 2019-01-15 RX ADMIN — ACETYLCYSTEINE 1 ML: 200 SOLUTION ORAL; RESPIRATORY (INHALATION) at 21:06

## 2019-01-15 RX ADMIN — PANTOPRAZOLE SODIUM SCH MG: 40 TABLET, DELAYED RELEASE ORAL at 06:05

## 2019-01-15 RX ADMIN — LORATADINE 1 MG: 10 TABLET ORAL at 09:10

## 2019-01-15 RX ADMIN — IPRATROPIUM BROMIDE AND ALBUTEROL SULFATE SCH ML: .5; 3 SOLUTION RESPIRATORY (INHALATION) at 08:57

## 2019-01-15 RX ADMIN — THERA TABS SCH TAB: TAB at 09:10

## 2019-01-15 RX ADMIN — IPRATROPIUM BROMIDE AND ALBUTEROL SULFATE 1 ML: .5; 3 SOLUTION RESPIRATORY (INHALATION) at 08:57

## 2019-01-15 RX ADMIN — THERA TABS 1 TAB: TAB at 09:10

## 2019-01-15 RX ADMIN — BENZONATATE PRN MG: 100 CAPSULE ORAL at 09:10

## 2019-01-15 RX ADMIN — LORATADINE SCH MG: 10 TABLET ORAL at 09:10

## 2019-01-15 RX ADMIN — LEVOTHYROXINE SODIUM SCH MCG: 88 TABLET ORAL at 06:04

## 2019-01-15 RX ADMIN — OLANZAPINE SCH MG: 2.5 TABLET, FILM COATED ORAL at 09:11

## 2019-01-15 RX ADMIN — CEFTRIAXONE 1 MLS/HR: 1 INJECTION, SOLUTION INTRAVENOUS at 12:09

## 2019-01-15 RX ADMIN — BENZONATATE 1 MG: 100 CAPSULE ORAL at 09:10

## 2019-01-15 RX ADMIN — PANTOPRAZOLE SODIUM 1 MG: 40 TABLET, DELAYED RELEASE ORAL at 06:05

## 2019-01-15 RX ADMIN — ACETYLCYSTEINE SCH ML: 200 SOLUTION ORAL; RESPIRATORY (INHALATION) at 21:06

## 2019-01-15 RX ADMIN — IPRATROPIUM BROMIDE AND ALBUTEROL SULFATE PRN ML: .5; 3 SOLUTION RESPIRATORY (INHALATION) at 21:06

## 2019-01-15 RX ADMIN — BENZONATATE 1 MG: 100 CAPSULE ORAL at 21:12

## 2019-01-15 RX ADMIN — IPRATROPIUM BROMIDE AND ALBUTEROL SULFATE SCH ML: .5; 3 SOLUTION RESPIRATORY (INHALATION) at 16:00

## 2019-01-15 RX ADMIN — ASPIRIN SCH MG: 81 TABLET, COATED ORAL at 09:10

## 2019-01-15 NOTE — PN
Date/Time of Note


Date/Time of Note


DATE: 1/15/19 


TIME: 13:52





Assessment/Plan


VTE Prophylaxis


Risk score (from Ns)>0 risk:  4


SCD applied (from Ns):  Yes


Pharmacological prophylaxis:  NA/contraindicated


Pharm contraindication:  low risk/ambulating





Lines/Catheters


IV Catheter Type (from [facility]):  Saline Lock


Urinary Cath still in place:  Yes


Reason Cath still needed:  other (indicate) (dementia)





Assessment/Plan


Assessment/Plan


87-year-old female with comorbidities including COPD, dementia, and 


hypothyroidism, who was brought in from an assisted living facility because of 


dyspnea with evidence of underlying sepsis and acute respiratory failure, who 


will be admitted to inpatient setting for further treatment and evaluation.





1.  Sepsis with leukocytosis and tachycardia, present on admission secondary to 


underlying suspected urinary tract infection and suspected tracheobronchitis. - 


NOW RESOLVED


-Continue course of azithromycin


-Pancultures negative so far.


-Judicious use of IV fluids.





2.  Acute respiratory failure.


-Hypoxic.


-Probably secondary to underlying COPD exacerbation.


-Continue inhaled bronchodilators.


-Continue tapering dose of steroids.


-Continue supplemental oxygen.


-I'm also adding N-acetylcysteine today for mucolytic effect. 





3.  Suspected urinary tract infection.


-Urine cultures negative.





4.  Dementia.


-Frequent reorientation.


-1:1 sitter. 


-PRN anxiolytics/antipsychotics.





5.  Hypothyroidism.


-Continue Synthroid.


-Decrease the dosing.





6.  Dysphagia.


-Status post placed therapy evaluation.


-Aspiration precautions.


-Pured diet.





7.  Fluids, electrolytes, and nutrition.


-Pured diet.





8.  DVT prophylaxis.


-Subcutaneous Lovenox (patient refusing).





9.  Plan.


-De-escalate antimicrobials.


-Continue inhaled bronchodilators and tapering dose of steroids.


-Await clinical improvement.


-PT evaluation.


Result Diagram:  


1/15/19 0526                                                                    


           1/15/19 0526





Results 24hrs





Laboratory Tests


               Test
                                1/15/19
05:26


               White Blood Count                          15.8  H


               Red Blood Count                            4.06  L


               Hemoglobin                                  12.4


               Hematocrit                                  38.4


               Mean Corpuscular Volume                     94.6


               Mean Corpuscular Hemoglobin                 30.5


               Mean Corpuscular Hemoglobin
Concent        32.3  



               Red Cell Distribution Width                14.6  H


               Platelet Count                               233


               Mean Platelet Volume                       12.4  H


               Immature Granulocytes %                   2.700  H


               Neutrophils %                               70.0


               Lymphocytes %                               19.9


               Monocytes %                                  6.5


               Eosinophils %                                0.4


               Basophils %                                  0.5


               Nucleated Red Blood Cells %                  0.0


               Immature Granulocytes #                   0.430  H


               Neutrophils #                              11.1  H


               Lymphocytes #                               3.1  H


               Monocytes #                                 1.0  H


               Eosinophils #                                0.1


               Basophils #                                  0.1


               Nucleated Red Blood Cells #                  0.0


               Sodium Level                                148  H


               Potassium Level                              3.9


               Chloride Level                               110


               Carbon Dioxide Level                          31


               Anion Gap                                      7


               Blood Urea Nitrogen                          29  H


               Creatinine                                  0.89


               Est Glomerular Filtrat Rate
mL/min     



               Glucose Level                                85  #


               Calcium Level                               10.0


               Magnesium Level                              2.5


               Troponin I                                 0.041








Subjective


24 Hr Interval Summary


Free Text/Dictation


No acute overnight events. Patient still requiring oxygen and with frequent weak


cough.





Exam/Review of Systems


Vital Signs


Vitals





Vital Signs


  Date      Temp  Pulse  Resp  B/P (MAP)   Pulse Ox  O2          O2 Flow    FiO2


Time                                                 Delivery    Rate


   1/15/19                                           Nasal             3.0


     09:00                                           Cannula


   1/15/19          103    17                    95


     08:59


   1/15/19  98.8                   140/80


     08:00                          (100)








Intake and Output





1/14/19


1/14/19


1/15/19





1515:00


23:00


07:00





IntakeIntake Total


50 ml


1100 ml


100 ml





OutputOutput Total


300 ml


500 ml





BalanceBalance


50 ml


800 ml


-400 ml














Exam


General: Frail appearing 87 year-old woman lying in bed in mild respiratory 


distress.


HEENT: Normocephalic, atraumatic. 


Eyes: Anicteric sclerae, conjunctivae clear. 


ENT: Nasal septum midline, oral mucosa is dry. Neck supple.


Respiratory: Poor inspiratory effort. Frequent dry weak cough. 


Cardiovascular: S1, S2 heard.  Regular rate and rhythm.


Abdomen: Soft, nontender, and nondistended. Bowel sounds positive in all 4 


quadrants.


Genitourinary: Akbar catheter in place draining straw-colored urine.


Extremities: No cyanosis, no clubbing, no edema. Peripheral pulses palpable.





Medications


Medications





Current Medications


IV Flush (NS 3 ml) 3 ml PER PROTOCOL IV ;  Start 1/12/19 at 12:30


Ondansetron HCl (Zofran Inj) 4 mg Q6H  PRN IV NAUSEA AND/OR VOMITING;  Start 


1/12/19 at 12:30


Hydralazine HCl (Apresoline) 10 mg Q6H  PRN IV SBP>160 Last administered on 


1/13/19at 07:59; Admin Dose 10 MG;  Start 1/12/19 at 14:00


Aspirin (Halfprin) 81 mg DAILY PO  Last administered on 1/15/19at 09:10; Admin 


Dose 81 MG;  Start 1/13/19 at 09:00


Loratadine (Claritin) 10 mg DAILY PO  Last administered on 1/15/19at 09:10; 


Admin Dose 10 MG;  Start 1/13/19 at 09:00


Multivitamins Therapeutic (Theragran) 1 tab DAILY PO  Last administered on 1/15


/19at 09:10; Admin Dose 1 TAB;  Start 1/13/19 at 09:00


Enoxaparin Sodium (Lovenox) 40 mg DAILY SC  Last administered on 1/15/19at 


09:12; Admin Dose 40 MG;  Start 1/13/19 at 09:00


Albuterol/ Ipratropium (Duoneb) 3 ml Q2H RESP THERAPY  PRN HHN SHORTNESS OF 


BREATH Last administered on 1/14/19at 20:53; Admin Dose 3 ML;  Start 1/12/19 at 


14:30


Levothyroxine Sodium (Synthroid) 88 mcg DAILY@06 PO  Last administered on 


1/15/19at 06:04; Admin Dose 88 MCG;  Start 1/14/19 at 06:00


Ceftriaxone Sodium 50 ml @  100 mls/hr Q24H IVPB  Last administered on 1/15/19at


12:09; Admin Dose 100 MLS/HR;  Start 1/13/19 at 11:00


Olanzapine (Zyprexa) 2.5 mg DAILY PO  Last administered on 1/15/19at 09:11; 


Admin Dose 2.5 MG;  Start 1/13/19 at 12:00


Benzonatate (Tessalon) 100 mg TID  PRN PO COUGH Last administered on 1/15/19at 


09:10; Admin Dose 100 MG;  Start 1/14/19 at 04:30


Albuterol/ Ipratropium (Duoneb) 3 ml Q8H RESP  THERAPY HHN  Last administered on


1/15/19at 08:57; Admin Dose 3 ML;  Start 1/14/19 at 16:00


Azithromycin (Zithromax) 500 mg DAILY PO  Last administered on 1/15/19at 09:10; 


Admin Dose 500 MG;  Start 1/14/19 at 13:00


Prednisone (Prednisone) 60 mg DAILY PO  Last administered on 1/15/19at 09:10; Ad


min Dose 60 MG;  Start 1/15/19 at 09:00;  Stop 1/17/19 at 23:00


Pantoprazole (Protonix Tab) 40 mg DAILY@06 PO  Last administered on 1/15/19at 


06:05; Admin Dose 40 MG;  Start 1/15/19 at 06:00


Docusate Sodium (Colace) 100 mg HS PO  Last administered on 1/14/19at 20:10; 


Admin Dose 100 MG;  Start 1/14/19 at 21:00


Quetiapine Fumarate (Seroquel) 25 mg HS  PRN PO AGITATION/ANXIETY Last 


administered on 1/14/19at 20:10; Admin Dose 25 MG;  Start 1/14/19 at 11:00


Acetylcysteine (Mucomyst) 2 ml Q6H RESP  THERAPY NEB ;  Start 1/15/19 at 14:00; 


Status UNV











JANKI SOSA MD              Gal 15, 2019 13:55

## 2019-01-16 VITALS — SYSTOLIC BLOOD PRESSURE: 165 MMHG | RESPIRATION RATE: 18 BRPM | HEART RATE: 74 BPM | DIASTOLIC BLOOD PRESSURE: 85 MMHG

## 2019-01-16 VITALS — HEART RATE: 69 BPM | SYSTOLIC BLOOD PRESSURE: 178 MMHG | RESPIRATION RATE: 18 BRPM | DIASTOLIC BLOOD PRESSURE: 82 MMHG

## 2019-01-16 VITALS — HEART RATE: 89 BPM | RESPIRATION RATE: 16 BRPM | DIASTOLIC BLOOD PRESSURE: 98 MMHG | SYSTOLIC BLOOD PRESSURE: 132 MMHG

## 2019-01-16 VITALS — HEART RATE: 72 BPM | DIASTOLIC BLOOD PRESSURE: 62 MMHG | SYSTOLIC BLOOD PRESSURE: 132 MMHG | RESPIRATION RATE: 18 BRPM

## 2019-01-16 LAB
ADD MAN DIFF?: NO
ANION GAP: 8 (ref 5–13)
BASOPHIL #: 0.1 10^3/UL (ref 0–0.1)
BASOPHILS %: 0.6 % (ref 0–2)
BLOOD UREA NITROGEN: 20 MG/DL (ref 7–20)
CALCIUM: 9.6 MG/DL (ref 8.4–10.2)
CARBON DIOXIDE: 33 MMOL/L (ref 21–31)
CHLORIDE: 104 MMOL/L (ref 97–110)
CREATININE: 0.9 MG/DL (ref 0.44–1)
EOSINOPHILS #: 0.2 10^3/UL (ref 0–0.5)
EOSINOPHILS %: 1.5 % (ref 0–7)
GLUCOSE: 90 MG/DL (ref 70–220)
HEMATOCRIT: 41.5 % (ref 37–47)
HEMOGLOBIN: 13.3 G/DL (ref 12–16)
IMMATURE GRANS #M: 0.4 10^3/UL (ref 0–0.03)
IMMATURE GRANS % (M): 2.8 % (ref 0–0.43)
LYMPHOCYTES #: 3.7 10^3/UL (ref 0.8–2.9)
LYMPHOCYTES %: 25.5 % (ref 15–51)
MAGNESIUM: 2.2 MG/DL (ref 1.7–2.5)
MEAN CORPUSCULAR HEMOGLOBIN: 29.6 PG (ref 29–33)
MEAN CORPUSCULAR HGB CONC: 32 G/DL (ref 32–37)
MEAN CORPUSCULAR VOLUME: 92.2 FL (ref 82–101)
MEAN PLATELET VOLUME: 11.8 FL (ref 7.4–10.4)
MONOCYTE #: 0.7 10^3/UL (ref 0.3–0.9)
MONOCYTES %: 5.1 % (ref 0–11)
NEUTROPHIL #: 9.3 10^3/UL (ref 1.6–7.5)
NEUTROPHILS %: 64.5 % (ref 39–77)
NUCLEATED RED BLOOD CELLS #: 0 10^3/UL (ref 0–0)
NUCLEATED RED BLOOD CELLS%: 0 /100WBC (ref 0–0)
PHOSPHORUS: 1.9 MG/DL (ref 2.5–4.9)
PLATELET COUNT: 229 10^3/UL (ref 140–415)
POTASSIUM: 3.6 MMOL/L (ref 3.5–5.1)
RED BLOOD COUNT: 4.5 10^6/UL (ref 4.2–5.4)
RED CELL DISTRIBUTION WIDTH: 14.3 % (ref 11.5–14.5)
SODIUM: 145 MMOL/L (ref 135–144)
WHITE BLOOD COUNT: 14.3 10^3/UL (ref 4.8–10.8)

## 2019-01-16 RX ADMIN — LEVOTHYROXINE SODIUM SCH MCG: 88 TABLET ORAL at 05:34

## 2019-01-16 RX ADMIN — LORATADINE SCH MG: 10 TABLET ORAL at 09:30

## 2019-01-16 RX ADMIN — ENOXAPARIN SODIUM 1 MG: 100 INJECTION SUBCUTANEOUS at 09:32

## 2019-01-16 RX ADMIN — BENZONATATE 1 MG: 100 CAPSULE ORAL at 20:38

## 2019-01-16 RX ADMIN — ASPIRIN 1 MG: 81 TABLET, COATED ORAL at 09:30

## 2019-01-16 RX ADMIN — ENOXAPARIN SODIUM SCH MG: 100 INJECTION SUBCUTANEOUS at 09:32

## 2019-01-16 RX ADMIN — ACETYLCYSTEINE SCH ML: 200 SOLUTION ORAL; RESPIRATORY (INHALATION) at 08:34

## 2019-01-16 RX ADMIN — IPRATROPIUM BROMIDE AND ALBUTEROL SULFATE PRN ML: .5; 3 SOLUTION RESPIRATORY (INHALATION) at 20:18

## 2019-01-16 RX ADMIN — ACETYLCYSTEINE 1 ML: 200 SOLUTION ORAL; RESPIRATORY (INHALATION) at 02:02

## 2019-01-16 RX ADMIN — IPRATROPIUM BROMIDE AND ALBUTEROL SULFATE 1 ML: .5; 3 SOLUTION RESPIRATORY (INHALATION) at 20:18

## 2019-01-16 RX ADMIN — IPRATROPIUM BROMIDE AND ALBUTEROL SULFATE SCH ML: .5; 3 SOLUTION RESPIRATORY (INHALATION) at 08:34

## 2019-01-16 RX ADMIN — QUETIAPINE FUMARATE PRN MG: 25 TABLET, FILM COATED ORAL at 02:14

## 2019-01-16 RX ADMIN — CEFTRIAXONE SCH MLS/HR: 1 INJECTION, SOLUTION INTRAVENOUS at 11:10

## 2019-01-16 RX ADMIN — DOCUSATE SODIUM 1 MG: 100 CAPSULE, LIQUID FILLED ORAL at 20:38

## 2019-01-16 RX ADMIN — ACETYLCYSTEINE SCH ML: 200 SOLUTION ORAL; RESPIRATORY (INHALATION) at 02:02

## 2019-01-16 RX ADMIN — IPRATROPIUM BROMIDE AND ALBUTEROL SULFATE SCH ML: .5; 3 SOLUTION RESPIRATORY (INHALATION) at 02:02

## 2019-01-16 RX ADMIN — AZITHROMYCIN SCH MG: 250 TABLET, FILM COATED ORAL at 09:30

## 2019-01-16 RX ADMIN — ACETYLCYSTEINE 1 ML: 200 SOLUTION ORAL; RESPIRATORY (INHALATION) at 20:18

## 2019-01-16 RX ADMIN — ASPIRIN SCH MG: 81 TABLET, COATED ORAL at 09:30

## 2019-01-16 RX ADMIN — CEFTRIAXONE 1 MLS/HR: 1 INJECTION, SOLUTION INTRAVENOUS at 11:10

## 2019-01-16 RX ADMIN — ACETYLCYSTEINE SCH ML: 200 SOLUTION ORAL; RESPIRATORY (INHALATION) at 20:18

## 2019-01-16 RX ADMIN — ACETYLCYSTEINE 1 ML: 200 SOLUTION ORAL; RESPIRATORY (INHALATION) at 15:14

## 2019-01-16 RX ADMIN — LORATADINE 1 MG: 10 TABLET ORAL at 09:30

## 2019-01-16 RX ADMIN — PANTOPRAZOLE SODIUM SCH MG: 40 TABLET, DELAYED RELEASE ORAL at 05:34

## 2019-01-16 RX ADMIN — QUETIAPINE FUMARATE 1 MG: 25 TABLET ORAL at 02:14

## 2019-01-16 RX ADMIN — THERA TABS SCH TAB: TAB at 09:30

## 2019-01-16 RX ADMIN — BENZONATATE PRN MG: 100 CAPSULE ORAL at 20:38

## 2019-01-16 RX ADMIN — OLANZAPINE 1 MG: 2.5 TABLET, FILM COATED ORAL at 09:31

## 2019-01-16 RX ADMIN — THERA TABS 1 TAB: TAB at 09:30

## 2019-01-16 RX ADMIN — LEVOTHYROXINE SODIUM 1 MCG: 88 TABLET ORAL at 05:34

## 2019-01-16 RX ADMIN — IPRATROPIUM BROMIDE AND ALBUTEROL SULFATE 1 ML: .5; 3 SOLUTION RESPIRATORY (INHALATION) at 02:02

## 2019-01-16 RX ADMIN — IPRATROPIUM BROMIDE AND ALBUTEROL SULFATE 1 ML: .5; 3 SOLUTION RESPIRATORY (INHALATION) at 08:34

## 2019-01-16 RX ADMIN — IPRATROPIUM BROMIDE AND ALBUTEROL SULFATE SCH ML: .5; 3 SOLUTION RESPIRATORY (INHALATION) at 15:14

## 2019-01-16 RX ADMIN — ACETYLCYSTEINE SCH ML: 200 SOLUTION ORAL; RESPIRATORY (INHALATION) at 15:14

## 2019-01-16 RX ADMIN — ACETYLCYSTEINE 1 ML: 200 SOLUTION ORAL; RESPIRATORY (INHALATION) at 08:34

## 2019-01-16 RX ADMIN — PANTOPRAZOLE SODIUM 1 MG: 40 TABLET, DELAYED RELEASE ORAL at 05:34

## 2019-01-16 RX ADMIN — DOCUSATE SODIUM SCH MG: 100 CAPSULE, LIQUID FILLED ORAL at 20:38

## 2019-01-16 RX ADMIN — IPRATROPIUM BROMIDE AND ALBUTEROL SULFATE 1 ML: .5; 3 SOLUTION RESPIRATORY (INHALATION) at 15:14

## 2019-01-16 RX ADMIN — AZITHROMYCIN 1 MG: 250 TABLET, FILM COATED ORAL at 09:30

## 2019-01-16 RX ADMIN — OLANZAPINE SCH MG: 2.5 TABLET, FILM COATED ORAL at 09:31

## 2019-01-16 NOTE — PN
Date/Time of Note


Date/Time of Note


DATE: 1/16/19 


TIME: 13:50





Assessment/Plan


VTE Prophylaxis


Risk score (from Ns)>0 risk:  4


SCD applied (from Ns):  Yes


Pharmacological prophylaxis:  heparin





Lines/Catheters


IV Catheter Type (from Nrs):  Saline Lock


Urinary Cath still in place:  Yes


Reason Cath still needed:  urinary retention





Assessment/Plan


Hospital Course


86 yo female with dementia who presented with apparent respiratory distress now 


resolved


- Will dc standing abx as no evidence of sepsis and clear XR


- Can stop systemic steroids, bronchodilators PRN


- Discharge back to nursing home





Comfort measures/DNR per POLST


Result Diagram:  


1/16/19 0423                                                                    


           1/16/19 0423





Results 24hrs





Laboratory Tests


               Test
                                1/16/19
04:23


               White Blood Count                          14.3  H


               Red Blood Count                             4.50


               Hemoglobin                                  13.3


               Hematocrit                                  41.5


               Mean Corpuscular Volume                     92.2


               Mean Corpuscular Hemoglobin                 29.6


               Mean Corpuscular Hemoglobin
Concent        32.0  



               Red Cell Distribution Width                 14.3


               Platelet Count                               229


               Mean Platelet Volume                       11.8  H


               Immature Granulocytes %                   2.800  H


               Neutrophils %                               64.5


               Lymphocytes %                               25.5


               Monocytes %                                  5.1


               Eosinophils %                                1.5


               Basophils %                                  0.6


               Nucleated Red Blood Cells %                  0.0


               Immature Granulocytes #                   0.400  H


               Neutrophils #                               9.3  H


               Lymphocytes #                               3.7  H


               Monocytes #                                  0.7


               Eosinophils #                                0.2


               Basophils #                                  0.1


               Nucleated Red Blood Cells #                  0.0


               Sodium Level                                145  H


               Potassium Level                              3.6


               Chloride Level                               104


               Carbon Dioxide Level                         33  H


               Anion Gap                                      8


               Blood Urea Nitrogen                           20


               Creatinine                                  0.90


               Est Glomerular Filtrat Rate
mL/min     



               Glucose Level                                 90


               Calcium Level                                9.6


               Phosphorus Level                           1.9  #L


               Magnesium Level                              2.2








Subjective


24 Hr Interval Summary


Free Text/Dictation


Apears very comfortable





Exam/Review of Systems


Vital Signs


Vitals





Vital Signs


  Date      Temp  Pulse  Resp  B/P (MAP)   Pulse Ox  O2          O2 Flow    FiO2


Time                                                 Delivery    Rate


   1/16/19           78    18                    97  Nasal             3.0


     08:43                                           Cannula


   1/16/19  98.5                   165/85


     07:25                          (111)








Intake and Output





1/15/19


1/15/19


1/16/19





1515:00


23:00


07:00





IntakeIntake Total


810 ml


360 ml


120 ml





OutputOutput Total


650 ml


1150 ml





BalanceBalance


810 ml


-290 ml


-1030 ml














Exam


Resting comfortably, no distress


Pleasnatly demented


CTAB


Soft nt tnd


RRR





Medications


Medications





Current Medications


IV Flush (NS 3 ml) 3 ml PER PROTOCOL IV ;  Start 1/12/19 at 12:30


Ondansetron HCl (Zofran Inj) 4 mg Q6H  PRN IV NAUSEA AND/OR VOMITING;  Start 


1/12/19 at 12:30


Hydralazine HCl (Apresoline) 10 mg Q6H  PRN IV SBP>160 Last administered on 


1/13/19at 07:59; Admin Dose 10 MG;  Start 1/12/19 at 14:00


Aspirin (Halfprin) 81 mg DAILY PO  Last administered on 1/16/19at 09:30; Admin 


Dose 81 MG;  Start 1/13/19 at 09:00


Loratadine (Claritin) 10 mg DAILY PO  Last administered on 1/16/19at 09:30; 


Admin Dose 10 MG;  Start 1/13/19 at 09:00


Multivitamins Therapeutic (Theragran) 1 tab DAILY PO  Last administered on 


1/16/19at 09:30; Admin Dose 1 TAB;  Start 1/13/19 at 09:00


Enoxaparin Sodium (Lovenox) 40 mg DAILY SC  Last administered on 1/16/19at 


09:32; Admin Dose 40 MG;  Start 1/13/19 at 09:00


Albuterol/ Ipratropium (Duoneb) 3 ml Q2H RESP THERAPY  PRN HHN SHORTNESS OF 


BREATH Last administered on 1/15/19at 21:06; Admin Dose 3 ML;  Start 1/12/19 at 


14:30


Levothyroxine Sodium (Synthroid) 88 mcg DAILY@06 PO  Last administered on 


1/16/19at 05:34; Admin Dose 88 MCG;  Start 1/14/19 at 06:00


Ceftriaxone Sodium 50 ml @  100 mls/hr Q24H IVPB  Last administered on 1/16/19at


11:10; Admin Dose 100 MLS/HR;  Start 1/13/19 at 11:00


Olanzapine (Zyprexa) 2.5 mg DAILY PO  Last administered on 1/16/19at 09:31; 


Admin Dose 2.5 MG;  Start 1/13/19 at 12:00


Benzonatate (Tessalon) 100 mg TID  PRN PO COUGH Last administered on 1/15/19at 


21:12; Admin Dose 100 MG;  Start 1/14/19 at 04:30


Albuterol/ Ipratropium (Duoneb) 3 ml Q8H RESP  THERAPY HHN  Last administered on


1/16/19at 08:34; Admin Dose 3 ML;  Start 1/14/19 at 16:00


Azithromycin (Zithromax) 500 mg DAILY PO  Last administered on 1/16/19at 09:30; 


Admin Dose 500 MG;  Start 1/14/19 at 13:00


Prednisone (Prednisone) 60 mg DAILY PO  Last administered on 1/16/19at 09:30; 


Admin Dose 60 MG;  Start 1/15/19 at 09:00;  Stop 1/17/19 at 23:00


Pantoprazole (Protonix Tab) 40 mg DAILY@06 PO  Last administered on 1/16/19at 


05:34; Admin Dose 40 MG;  Start 1/15/19 at 06:00


Docusate Sodium (Colace) 100 mg HS PO  Last administered on 1/15/19at 21:12; 


Admin Dose 100 MG;  Start 1/14/19 at 21:00


Quetiapine Fumarate (Seroquel) 25 mg HS  PRN PO AGITATION/ANXIETY Last 


administered on 1/16/19at 02:14; Admin Dose 25 MG;  Start 1/14/19 at 11:00


Acetylcysteine (Mucomyst) 2 ml Q6H RESP  THERAPY NEB  Last administered on 


1/16/19at 08:34; Admin Dose 2 ML;  Start 1/15/19 at 14:00











HIREN DOMINIQUE MD          Jan 16, 2019 13:52

## 2019-01-17 VITALS — SYSTOLIC BLOOD PRESSURE: 134 MMHG | HEART RATE: 90 BPM | RESPIRATION RATE: 18 BRPM | DIASTOLIC BLOOD PRESSURE: 91 MMHG

## 2019-01-17 VITALS — SYSTOLIC BLOOD PRESSURE: 137 MMHG | HEART RATE: 70 BPM | DIASTOLIC BLOOD PRESSURE: 76 MMHG | RESPIRATION RATE: 18 BRPM

## 2019-01-17 VITALS — DIASTOLIC BLOOD PRESSURE: 58 MMHG | SYSTOLIC BLOOD PRESSURE: 124 MMHG | RESPIRATION RATE: 16 BRPM | HEART RATE: 84 BPM

## 2019-01-17 RX ADMIN — ENOXAPARIN SODIUM SCH MG: 100 INJECTION SUBCUTANEOUS at 08:12

## 2019-01-17 RX ADMIN — IPRATROPIUM BROMIDE AND ALBUTEROL SULFATE SCH ML: .5; 3 SOLUTION RESPIRATORY (INHALATION) at 00:54

## 2019-01-17 RX ADMIN — ACETYLCYSTEINE 1 ML: 200 SOLUTION ORAL; RESPIRATORY (INHALATION) at 23:49

## 2019-01-17 RX ADMIN — LORATADINE 1 MG: 10 TABLET ORAL at 08:11

## 2019-01-17 RX ADMIN — PANTOPRAZOLE SODIUM SCH MG: 40 TABLET, DELAYED RELEASE ORAL at 05:53

## 2019-01-17 RX ADMIN — PANTOPRAZOLE SODIUM 1 MG: 40 TABLET, DELAYED RELEASE ORAL at 05:53

## 2019-01-17 RX ADMIN — THERA TABS SCH TAB: TAB at 08:11

## 2019-01-17 RX ADMIN — ACETYLCYSTEINE SCH ML: 200 SOLUTION ORAL; RESPIRATORY (INHALATION) at 07:51

## 2019-01-17 RX ADMIN — LEVOTHYROXINE SODIUM SCH MCG: 88 TABLET ORAL at 05:53

## 2019-01-17 RX ADMIN — ACETYLCYSTEINE SCH ML: 200 SOLUTION ORAL; RESPIRATORY (INHALATION) at 01:05

## 2019-01-17 RX ADMIN — ACETYLCYSTEINE 1 ML: 200 SOLUTION ORAL; RESPIRATORY (INHALATION) at 07:51

## 2019-01-17 RX ADMIN — ACETYLCYSTEINE SCH ML: 200 SOLUTION ORAL; RESPIRATORY (INHALATION) at 23:49

## 2019-01-17 RX ADMIN — OLANZAPINE SCH MG: 2.5 TABLET, FILM COATED ORAL at 08:11

## 2019-01-17 RX ADMIN — IPRATROPIUM BROMIDE AND ALBUTEROL SULFATE SCH ML: .5; 3 SOLUTION RESPIRATORY (INHALATION) at 07:51

## 2019-01-17 RX ADMIN — ACETYLCYSTEINE 1 ML: 200 SOLUTION ORAL; RESPIRATORY (INHALATION) at 16:59

## 2019-01-17 RX ADMIN — ACETYLCYSTEINE SCH ML: 200 SOLUTION ORAL; RESPIRATORY (INHALATION) at 16:59

## 2019-01-17 RX ADMIN — ACETYLCYSTEINE 1 ML: 200 SOLUTION ORAL; RESPIRATORY (INHALATION) at 01:05

## 2019-01-17 RX ADMIN — LEVOTHYROXINE SODIUM 1 MCG: 88 TABLET ORAL at 05:53

## 2019-01-17 RX ADMIN — QUETIAPINE FUMARATE 1 MG: 25 TABLET ORAL at 19:48

## 2019-01-17 RX ADMIN — THERA TABS 1 TAB: TAB at 08:11

## 2019-01-17 RX ADMIN — LORATADINE SCH MG: 10 TABLET ORAL at 08:11

## 2019-01-17 RX ADMIN — IPRATROPIUM BROMIDE AND ALBUTEROL SULFATE PRN ML: .5; 3 SOLUTION RESPIRATORY (INHALATION) at 23:49

## 2019-01-17 RX ADMIN — IPRATROPIUM BROMIDE AND ALBUTEROL SULFATE PRN ML: .5; 3 SOLUTION RESPIRATORY (INHALATION) at 16:58

## 2019-01-17 RX ADMIN — OLANZAPINE 1 MG: 2.5 TABLET, FILM COATED ORAL at 08:11

## 2019-01-17 RX ADMIN — QUETIAPINE FUMARATE PRN MG: 25 TABLET, FILM COATED ORAL at 19:48

## 2019-01-17 RX ADMIN — IPRATROPIUM BROMIDE AND ALBUTEROL SULFATE 1 ML: .5; 3 SOLUTION RESPIRATORY (INHALATION) at 23:49

## 2019-01-17 RX ADMIN — IPRATROPIUM BROMIDE AND ALBUTEROL SULFATE 1 ML: .5; 3 SOLUTION RESPIRATORY (INHALATION) at 00:54

## 2019-01-17 RX ADMIN — DOCUSATE SODIUM SCH MG: 100 CAPSULE, LIQUID FILLED ORAL at 19:47

## 2019-01-17 RX ADMIN — IPRATROPIUM BROMIDE AND ALBUTEROL SULFATE 1 ML: .5; 3 SOLUTION RESPIRATORY (INHALATION) at 07:51

## 2019-01-17 RX ADMIN — DOCUSATE SODIUM 1 MG: 100 CAPSULE, LIQUID FILLED ORAL at 19:47

## 2019-01-17 RX ADMIN — IPRATROPIUM BROMIDE AND ALBUTEROL SULFATE 1 ML: .5; 3 SOLUTION RESPIRATORY (INHALATION) at 16:58

## 2019-01-17 RX ADMIN — LORAZEPAM 1 MG: 2 INJECTION, SOLUTION INTRAMUSCULAR; INTRAVENOUS at 20:25

## 2019-01-17 RX ADMIN — ENOXAPARIN SODIUM 1 MG: 100 INJECTION SUBCUTANEOUS at 08:12

## 2019-01-17 NOTE — PN
Date/Time of Note


Date/Time of Note


DATE: 1/17/19 


TIME: 15:22





Assessment/Plan


VTE Prophylaxis


Risk score (from Ns)>0 risk:  6


SCD applied (from Ns):  Yes


SCD contraindicated:  low risk/ambulating


Pharmacological prophylaxis:  LMWH





Lines/Catheters


IV Catheter Type (from Nrsg):  Peripheral IV


Urinary Cath still in place:  No





Assessment/Plan


Hospital Course





Assessment and plan


1.  Acute hypoxic respiratory failure, stable Taper O2 as possible


2.  Bronchitis vs pneumonia vs viral illness, stable finish empiric therapy.  


influenza stain was negative


3.  Dementia with delirium supportive care continue trazodone and Ativan as 


needed.  Reorient/DC tethers if possible


4.  Failure to thrive back to assisted living versus sniff


5.  Chronic hypothyroidism


6.  Chronic COPD


7.  Abn EKG: poor R through septal leads, asymptomatic, continue medical 


management 





S: 


1/14 less pulmonary distress. Delirium and anxiety overnight.  Will need family 


at bed side during daytime.  Discontinue O2/Akbar when stable: 


1/17 no distress except anxiety.





Objective: Vital signs stable





Physical exam


No pallor JVD droop


Regular no murmur rub gallop


ctab no tachypnea


Bs+ nt nd; no RRG


No edemaHomans


Result Diagram:  


1/16/19 0423 1/16/19 0423








Exam/Review of Systems


Vital Signs


Vitals





Vital Signs


  Date      Temp  Pulse  Resp  B/P (MAP)   Pulse Ox  O2          O2 Flow    FiO2


Time                                                 Delivery    Rate


   1/17/19  98.4     84    16      124/58        96


     12:30                           (80)


   1/17/19                                                                    21


     07:55


   1/17/19                                                             3.0


     04:02


   1/17/19                                           Nasal


     00:54                                           Cannula








Intake and Output





1/16/19 1/16/19 1/17/19





1515:00


23:00


07:00





IntakeIntake Total


350 ml





BalanceBalance


350 ml














Medications


Medications





Current Medications


IV Flush (NS 3 ml) 3 ml PER PROTOCOL IV ;  Start 1/12/19 at 12:30


Ondansetron HCl (Zofran Inj) 4 mg Q6H  PRN IV NAUSEA AND/OR VOMITING;  Start 


1/12/19 at 12:30


Hydralazine HCl (Apresoline) 10 mg Q6H  PRN IV SBP>160 Last administered on 


1/13/19at 07:59; Admin Dose 10 MG;  Start 1/12/19 at 14:00


Loratadine (Claritin) 10 mg DAILY PO  Last administered on 1/17/19at 08:11; 


Admin Dose 10 MG;  Start 1/13/19 at 09:00


Multivitamins Therapeutic (Theragran) 1 tab DAILY PO  Last administered on 


1/17/19at 08:11; Admin Dose 1 TAB;  Start 1/13/19 at 09:00


Enoxaparin Sodium (Lovenox) 40 mg DAILY SC  Last administered on 1/17/19at 


08:12; Admin Dose 40 MG;  Start 1/13/19 at 09:00


Albuterol/ Ipratropium (Duoneb) 3 ml Q2H RESP THERAPY  PRN HHN SHORTNESS OF 


BREATH Last administered on 1/16/19at 20:18; Admin Dose 3 ML;  Start 1/12/19 at 


14:30


Levothyroxine Sodium (Synthroid) 88 mcg DAILY@06 PO  Last administered on 


1/17/19at 05:53; Admin Dose 88 MCG;  Start 1/14/19 at 06:00


Olanzapine (Zyprexa) 2.5 mg DAILY PO  Last administered on 1/17/19at 08:11; 


Admin Dose 2.5 MG;  Start 1/13/19 at 12:00


Benzonatate (Tessalon) 100 mg TID  PRN PO COUGH Last administered on 1/16/19at 


20:38; Admin Dose 100 MG;  Start 1/14/19 at 04:30


Albuterol/ Ipratropium (Duoneb) 3 ml Q8H RESP  THERAPY HHN  Last administered on


1/17/19at 07:51; Admin Dose 3 ML;  Start 1/14/19 at 16:00


Pantoprazole (Protonix Tab) 40 mg DAILY@06 PO  Last administered on 1/17/19at 


05:53; Admin Dose 40 MG;  Start 1/15/19 at 06:00


Docusate Sodium (Colace) 100 mg HS PO  Last administered on 1/16/19at 20:38; 


Admin Dose 100 MG;  Start 1/14/19 at 21:00


Quetiapine Fumarate (Seroquel) 25 mg HS  PRN PO AGITATION/ANXIETY Last 


administered on 1/16/19at 02:14; Admin Dose 25 MG;  Start 1/14/19 at 11:00


Acetylcysteine (Mucomyst) 2 ml Q8H RESP  THERAPY NEB ;  Start 1/17/19 at 16:00











FRANNY LEON MD         Jan 17, 2019 15:24

## 2019-01-18 VITALS — SYSTOLIC BLOOD PRESSURE: 135 MMHG | DIASTOLIC BLOOD PRESSURE: 61 MMHG | RESPIRATION RATE: 18 BRPM | HEART RATE: 78 BPM

## 2019-01-18 VITALS — RESPIRATION RATE: 18 BRPM | SYSTOLIC BLOOD PRESSURE: 185 MMHG | HEART RATE: 68 BPM | DIASTOLIC BLOOD PRESSURE: 88 MMHG

## 2019-01-18 VITALS — HEART RATE: 84 BPM | RESPIRATION RATE: 18 BRPM | DIASTOLIC BLOOD PRESSURE: 77 MMHG | SYSTOLIC BLOOD PRESSURE: 130 MMHG

## 2019-01-18 VITALS — SYSTOLIC BLOOD PRESSURE: 139 MMHG | DIASTOLIC BLOOD PRESSURE: 65 MMHG

## 2019-01-18 VITALS — SYSTOLIC BLOOD PRESSURE: 152 MMHG | HEART RATE: 70 BPM | RESPIRATION RATE: 16 BRPM | DIASTOLIC BLOOD PRESSURE: 67 MMHG

## 2019-01-18 RX ADMIN — LORATADINE SCH MG: 10 TABLET ORAL at 09:31

## 2019-01-18 RX ADMIN — IPRATROPIUM BROMIDE AND ALBUTEROL SULFATE 1 ML: .5; 3 SOLUTION RESPIRATORY (INHALATION) at 08:34

## 2019-01-18 RX ADMIN — LORATADINE 1 MG: 10 TABLET ORAL at 09:31

## 2019-01-18 RX ADMIN — ACETYLCYSTEINE SCH ML: 200 SOLUTION ORAL; RESPIRATORY (INHALATION) at 08:34

## 2019-01-18 RX ADMIN — IPRATROPIUM BROMIDE AND ALBUTEROL SULFATE PRN ML: .5; 3 SOLUTION RESPIRATORY (INHALATION) at 15:28

## 2019-01-18 RX ADMIN — ACETYLCYSTEINE 1 ML: 200 SOLUTION ORAL; RESPIRATORY (INHALATION) at 15:28

## 2019-01-18 RX ADMIN — THERA TABS SCH TAB: TAB at 09:31

## 2019-01-18 RX ADMIN — LEVOTHYROXINE SODIUM SCH MCG: 88 TABLET ORAL at 06:05

## 2019-01-18 RX ADMIN — IPRATROPIUM BROMIDE AND ALBUTEROL SULFATE 1 ML: .5; 3 SOLUTION RESPIRATORY (INHALATION) at 15:28

## 2019-01-18 RX ADMIN — IPRATROPIUM BROMIDE AND ALBUTEROL SULFATE PRN ML: .5; 3 SOLUTION RESPIRATORY (INHALATION) at 08:34

## 2019-01-18 RX ADMIN — PANTOPRAZOLE SODIUM 1 MG: 40 TABLET, DELAYED RELEASE ORAL at 06:05

## 2019-01-18 RX ADMIN — OLANZAPINE SCH MG: 2.5 TABLET, FILM COATED ORAL at 09:31

## 2019-01-18 RX ADMIN — ENOXAPARIN SODIUM SCH MG: 100 INJECTION SUBCUTANEOUS at 09:35

## 2019-01-18 RX ADMIN — OLANZAPINE SCH MG: 2.5 TABLET, FILM COATED ORAL at 11:00

## 2019-01-18 RX ADMIN — PANTOPRAZOLE SODIUM SCH MG: 40 TABLET, DELAYED RELEASE ORAL at 06:05

## 2019-01-18 RX ADMIN — DOCUSATE SODIUM 1 MG: 100 CAPSULE, LIQUID FILLED ORAL at 21:24

## 2019-01-18 RX ADMIN — AZITHROMYCIN SCH MG: 250 TABLET, FILM COATED ORAL at 12:06

## 2019-01-18 RX ADMIN — AZITHROMYCIN 1 MG: 250 TABLET, FILM COATED ORAL at 12:06

## 2019-01-18 RX ADMIN — DOCUSATE SODIUM SCH MG: 100 CAPSULE, LIQUID FILLED ORAL at 21:24

## 2019-01-18 RX ADMIN — ACETYLCYSTEINE 1 ML: 200 SOLUTION ORAL; RESPIRATORY (INHALATION) at 08:34

## 2019-01-18 RX ADMIN — OLANZAPINE SCH MG: 2.5 TABLET, FILM COATED ORAL at 21:25

## 2019-01-18 RX ADMIN — ACETYLCYSTEINE SCH ML: 200 SOLUTION ORAL; RESPIRATORY (INHALATION) at 15:28

## 2019-01-18 RX ADMIN — OLANZAPINE 1 MG: 2.5 TABLET, FILM COATED ORAL at 21:25

## 2019-01-18 RX ADMIN — OLANZAPINE 1 MG: 2.5 TABLET, FILM COATED ORAL at 09:31

## 2019-01-18 RX ADMIN — OLANZAPINE 1 MG: 2.5 TABLET, FILM COATED ORAL at 11:00

## 2019-01-18 RX ADMIN — ENOXAPARIN SODIUM 1 MG: 100 INJECTION SUBCUTANEOUS at 09:35

## 2019-01-18 RX ADMIN — THERA TABS 1 TAB: TAB at 09:31

## 2019-01-18 RX ADMIN — LEVOTHYROXINE SODIUM 1 MCG: 88 TABLET ORAL at 06:05

## 2019-01-18 NOTE — PN
Date/Time of Note


Date/Time of Note


DATE: 1/18/19 


TIME: 12:56





Assessment/Plan


VTE Prophylaxis


Risk score (from Nsg)>0 risk:  6


SCD applied (from Ns):  Yes


Pharmacological prophylaxis:  LMWH





Lines/Catheters


IV Catheter Type (from Nrsg):  Saline Lock


Urinary Cath still in place:  No





Assessment/Plan


Hospital Course





Assessment and plan


1.  Acute hypoxic respiratory failure, stable Taper O2 as possible


2.  Bronchitis vs pneumonia vs viral illness, stable finish empiric therapy.  


influenza stain was negative


3.  Dementia with delirium supportive care continue trazodone and Ativan as need


ed.  Reorient/DC tethers if possible.


4.  Failure to thrive back to assisted living versus sniff


5.  Chronic hypothyroidism


6.  Chronic COPD


7.  Abn EKG: poor R through septal leads, asymptomatic, continue medical 


management 





S: 


1/14 less pulmonary distress. Delirium and anxiety overnight.  Will need family 


at bed side during daytime.  Discontinue O2/Akbar when stable: 


1/17 no distress except anxiety.


1/18: sitter dced. patient better; doesnt require sitter/ restraints. continue 


fall precautions at snf.





Objective: Vital signs stable





Physical exam


No pallor JVD droop


Regular no murmur rub gallop


ctab no tachypnea


Bs+ nt nd; no RRG


No edemaHomans


Result Diagram:  


1/16/19 0423 1/16/19 0423








Exam/Review of Systems


Vital Signs


Vitals





Vital Signs


  Date      Temp  Pulse  Resp  B/P (MAP)   Pulse Ox  O2          O2 Flow    FiO2


Time                                                 Delivery    Rate


   1/18/19                         139/65


     10:35                           (89)


   1/18/19           68    20                    92                           21


     08:34


   1/18/19  98.2                                     Room Air


     08:05


   1/18/19                                                             3.0


     00:01








Intake and Output





1/17/19 1/17/19 1/18/19





1515:00


23:00


07:00





IntakeIntake Total


600 ml


480 ml





BalanceBalance


600 ml


480 ml














Medications


Medications





Current Medications


IV Flush (NS 3 ml) 3 ml PER PROTOCOL IV ;  Start 1/12/19 at 12:30


Ondansetron HCl (Zofran Inj) 4 mg Q6H  PRN IV NAUSEA AND/OR VOMITING;  Start 


1/12/19 at 12:30


Hydralazine HCl (Apresoline) 10 mg Q6H  PRN IV SBP>160 Last administered on 


1/13/19at 07:59; Admin Dose 10 MG;  Start 1/12/19 at 14:00


Loratadine (Claritin) 10 mg DAILY PO  Last administered on 1/18/19at 09:31; 


Admin Dose 10 MG;  Start 1/13/19 at 09:00


Multivitamins Therapeutic (Theragran) 1 tab DAILY PO  Last administered on 


1/18/19at 09:31; Admin Dose 1 TAB;  Start 1/13/19 at 09:00


Enoxaparin Sodium (Lovenox) 40 mg DAILY SC  Last administered on 1/18/19at 


09:35; Admin Dose 40 MG;  Start 1/13/19 at 09:00


Albuterol/ Ipratropium (Duoneb) 3 ml Q2H RESP THERAPY  PRN HHN SHORTNESS OF 


BREATH Last administered on 1/18/19at 08:34; Admin Dose 3 ML;  Start 1/12/19 at 


14:30


Levothyroxine Sodium (Synthroid) 88 mcg DAILY@06 PO  Last administered on 1 /18/19at 06:05; Admin Dose 88 MCG;  Start 1/14/19 at 06:00


Benzonatate (Tessalon) 100 mg TID  PRN PO COUGH Last administered on 1/16/19at 


20:38; Admin Dose 100 MG;  Start 1/14/19 at 04:30


Pantoprazole (Protonix Tab) 40 mg DAILY@06 PO  Last administered on 1/18/19at 


06:05; Admin Dose 40 MG;  Start 1/15/19 at 06:00


Docusate Sodium (Colace) 100 mg HS PO  Last administered on 1/17/19at 19:47; 


Admin Dose 100 MG;  Start 1/14/19 at 21:00


Quetiapine Fumarate (Seroquel) 25 mg HS  PRN PO AGITATION/ANXIETY Last 


administered on 1/17/19at 19:48; Admin Dose 25 MG;  Start 1/14/19 at 11:00


Acetylcysteine (Mucomyst) 2 ml Q8H RESP  THERAPY NEB  Last administered on 


1/18/19at 08:34; Admin Dose 2 ML;  Start 1/17/19 at 16:00


Olanzapine (Zyprexa) 2.5 mg BID PO ;  Start 1/18/19 at 11:00


Azithromycin (Zithromax) 500 mg DAILY PO  Last administered on 1/18/19at 12:06; 


Admin Dose 500 MG;  Start 1/18/19 at 11:00











FRANNY LEON MD         Jan 18, 2019 12:57

## 2019-01-18 NOTE — DS
Date/Time of Note


Date/Time of Note


DATE: 1/18/19 


TIME: 12:58





Discharge Summary


Admission/Discharge Info


Admit Date/Time


Jan 12, 2019 at 11:21


Discharge Date/Time





Patient Condition:  Stable


Procedures


                                        


PROCEDURE:   XR Chest. 


 


CLINICAL INDICATION:   cough 


 


TECHNIQUE:   Portable AP view of the chest was obtained. 


 


COMPARISON:   DR JEONG 1/12/2019 


 CXR #2


FINDINGS:


Slight interval increase in patchy opacities in the lung bases with tiny pleural


effusions. Stable interstitial changes. No pneumothorax. Stable heart size. 


Calcified atherosclerosis of the thoracic aorta.


 


IMPRESSION:


Slight interval increase in bibasilar pulmonary opacities and small pleural 


effusions. Findings may represent atelectasis, however consider early aspiration


or pneumonia.


Hospital Course





H COURSE


- admitted with respiratory failure due to pneumonia. presently stable and fit 


for discharge to snf on Azithro & oxygen. Has delirium, will continue fall 


precautions & assistance at SNF. Dementia, may consider hospice care & to visit 


Neuro too





Assessment and plan


1.  Acute hypoxic respiratory failure, stable Taper O2 as possible


2.  Bronchitis vs pneumonia vs viral illness, stable finish empiric therapy.  


influenza stain was negative


3.  Dementia with delirium supportive care continue trazodone and Ativan as 


needed.  Reorient/DC tethers if possible.


4.  Failure to thrive back to assisted living versus sniff


5.  Chronic hypothyroidism


6.  Chronic COPD


7.  Abn EKG: poor R through septal leads, asymptomatic, continue medical 


management 





S: 


1/14 less pulmonary distress. Delirium and anxiety overnight.  Will need family 


at bed side during daytime.  Discontinue O2/Akbar when stable: 


1/17 no distress except anxiety.


1/18: sitter dced. patient better, calm, no agitation; doesnt require sitter/ 


restraints. continue fall precautions at snf.





Objective: Vital signs stable





Physical exam


No pallor JVD droop


Regular no murmur rub gallop


ctab no tachypnea


Bs+ nt nd; no RRG


No edemaHomans


Home Meds


Reported Medications


Trazodone Hcl* (Trazodone Hcl*) 50 Mg Tablet, 25 MG PO QHS, #30 TAB


   1/12/19


Aspirin* (Aspirin* EC) 81 Mg Tablet., 81 MG PO DAILY, TAB


   1/12/19


Acetaminophen* (Acetaminophen*) 325 Mg Tablet, 325 MG PO Q6H PRN for PAIN AND OR


ELEVATED TEMP, #30 TAB


   1/12/19


Multivitamins* (Theragran*) 1 Tab Tab, 1 TAB PO DAILY, TAB


   1/12/19


Loratadine* (Loratadine*) 10 Mg Tablet, 10 MG PO DAILY, #30 TAB


   1/12/19


Discontinued Reported Medications


Levothyroxine Sodium* (Levothyroxine Sodium*) 150 Mcg Tablet, 150 MCG PO BEFORE 


BREAKFAST, #30 TAB


   Q WED-SUN


   1/12/19


Levothyroxine Sodium* (Levothyroxine Sodium*) 125 Mcg Tablet, 125 MCG PO BEFORE 


BREAKFAST, #30 TAB


   Q [id number]


   1/12/19


Lorazepam* (Lorazepam*) 0.5 Mg Tablet, 0.25 MG PO HS PRN for ANXIETY, TAB


   1/12/19


Celecoxib* (Celebrex*) 100 Mg Capsule, 100 MG PO BID, CAP


   1/12/19


Acetaminophen* (Acetaminophen*) 500 MG Extra Strength Tablet, 1000 MG PO QHS PRN


for PAIN AND OR ELEVATED TEMP, TAB


   11/26/17


Trazodone Hcl* (Trazodone Hcl*) 50 Mg Tablet, 50 MG PO Q8 PRN for SLEEP, #60 TAB


   11/26/17


Memantine* (Namenda* XR) 28 Mg Cap.spr.24, 28 MG PO DAILY, #30 TAB


   11/26/17


Loratadine* (Loratadine*) 10 Mg Tablet, 10 MG PO DAILY, #30 TAB


   11/26/17


Levothyroxine Sodium* (Levoxyl*) 150 Mcg Tablet, 150 MCG PO WED,AND SUN, #30 TAB


   11/26/17


Levothyroxine Sodium* (Levoxyl*) 125 Mcg Tablet, 125 MCG PO 


MON,TUE,THUR,FRI,SAT, #30 TAB


   11/26/17


Donepezil* (Donepezil*) 5 Mg Tablet, 5 MG PO QHS, #30 TAB


   11/26/17


Cyanocobalamin* (Vitamin B12*) 500 Mcg Tab, 500 MCG PO DAILY, TAB


   11/26/17


Cholecalciferol (Vitamin D3) (VITAMIN D-3) 2,000 Unit Capsule, 2000 UNIT PO 


DAILY, CAP


   11/26/17


Celecoxib* (Celebrex*) 100 Mg Capsule, 100 MG PO BID, CAP


   11/26/17


Salmeterol Xinaf/Fluticasone* (Advair*) 250-50 Diskus Inhaler, 1 INH INHALATION 


BID, #1 INHALER


   11/26/17


Follow-up Plan


appt PCP & Hospice Consult 1-2wks


Primary Care Provider


Not On Staff Doctor











FRANNY LEON MD         Jan 18, 2019 13:01

## 2019-01-19 VITALS — HEART RATE: 67 BPM | RESPIRATION RATE: 16 BRPM | SYSTOLIC BLOOD PRESSURE: 133 MMHG | DIASTOLIC BLOOD PRESSURE: 92 MMHG

## 2019-01-19 VITALS — DIASTOLIC BLOOD PRESSURE: 62 MMHG | RESPIRATION RATE: 14 BRPM | HEART RATE: 84 BPM | SYSTOLIC BLOOD PRESSURE: 117 MMHG

## 2019-01-19 VITALS — SYSTOLIC BLOOD PRESSURE: 135 MMHG | RESPIRATION RATE: 16 BRPM | HEART RATE: 69 BPM | DIASTOLIC BLOOD PRESSURE: 73 MMHG

## 2019-01-19 LAB
ADD MAN DIFF?: NO
ANION GAP: 4 (ref 5–13)
BASOPHIL #: 0.1 10^3/UL (ref 0–0.1)
BASOPHILS %: 0.7 % (ref 0–2)
BLOOD UREA NITROGEN: 15 MG/DL (ref 7–20)
CALCIUM: 9.5 MG/DL (ref 8.4–10.2)
CARBON DIOXIDE: 33 MMOL/L (ref 21–31)
CHLORIDE: 102 MMOL/L (ref 97–110)
CREATININE: 0.87 MG/DL (ref 0.44–1)
EOSINOPHILS #: 0.5 10^3/UL (ref 0–0.5)
EOSINOPHILS %: 3.8 % (ref 0–7)
GLUCOSE: 105 MG/DL (ref 70–220)
HEMATOCRIT: 46 % (ref 37–47)
HEMOGLOBIN: 14.7 G/DL (ref 12–16)
IMMATURE GRANS #M: 0.2 10^3/UL (ref 0–0.03)
IMMATURE GRANS % (M): 1.5 % (ref 0–0.43)
LYMPHOCYTES #: 2.7 10^3/UL (ref 0.8–2.9)
LYMPHOCYTES %: 20.1 % (ref 15–51)
MEAN CORPUSCULAR HEMOGLOBIN: 29.6 PG (ref 29–33)
MEAN CORPUSCULAR HGB CONC: 32 G/DL (ref 32–37)
MEAN CORPUSCULAR VOLUME: 92.7 FL (ref 82–101)
MEAN PLATELET VOLUME: 11.4 FL (ref 7.4–10.4)
MONOCYTE #: 0.8 10^3/UL (ref 0.3–0.9)
MONOCYTES %: 6 % (ref 0–11)
NEUTROPHIL #: 9.1 10^3/UL (ref 1.6–7.5)
NEUTROPHILS %: 67.9 % (ref 39–77)
NUCLEATED RED BLOOD CELLS #: 0 10^3/UL (ref 0–0)
NUCLEATED RED BLOOD CELLS%: 0 /100WBC (ref 0–0)
PLATELET COUNT: 243 10^3/UL (ref 140–415)
POTASSIUM: 3.9 MMOL/L (ref 3.5–5.1)
RED BLOOD COUNT: 4.96 10^6/UL (ref 4.2–5.4)
RED CELL DISTRIBUTION WIDTH: 14.7 % (ref 11.5–14.5)
SODIUM: 139 MMOL/L (ref 135–144)
WHITE BLOOD COUNT: 13.4 10^3/UL (ref 4.8–10.8)

## 2019-01-19 RX ADMIN — QUETIAPINE FUMARATE PRN MG: 25 TABLET, FILM COATED ORAL at 00:09

## 2019-01-19 RX ADMIN — LORATADINE 1 MG: 10 TABLET ORAL at 08:42

## 2019-01-19 RX ADMIN — LORATADINE SCH MG: 10 TABLET ORAL at 08:42

## 2019-01-19 RX ADMIN — AZITHROMYCIN 1 MG: 250 TABLET, FILM COATED ORAL at 08:42

## 2019-01-19 RX ADMIN — ACETYLCYSTEINE SCH ML: 200 SOLUTION ORAL; RESPIRATORY (INHALATION) at 08:00

## 2019-01-19 RX ADMIN — LEVOTHYROXINE SODIUM 1 MCG: 88 TABLET ORAL at 05:46

## 2019-01-19 RX ADMIN — IPRATROPIUM BROMIDE AND ALBUTEROL SULFATE PRN ML: .5; 3 SOLUTION RESPIRATORY (INHALATION) at 01:30

## 2019-01-19 RX ADMIN — QUETIAPINE FUMARATE 1 MG: 25 TABLET ORAL at 00:09

## 2019-01-19 RX ADMIN — OLANZAPINE 1 MG: 2.5 TABLET, FILM COATED ORAL at 08:42

## 2019-01-19 RX ADMIN — THERA TABS SCH TAB: TAB at 08:42

## 2019-01-19 RX ADMIN — PANTOPRAZOLE SODIUM SCH MG: 40 TABLET, DELAYED RELEASE ORAL at 05:46

## 2019-01-19 RX ADMIN — ACETYLCYSTEINE SCH ML: 200 SOLUTION ORAL; RESPIRATORY (INHALATION) at 01:30

## 2019-01-19 RX ADMIN — LEVOTHYROXINE SODIUM SCH MCG: 88 TABLET ORAL at 05:46

## 2019-01-19 RX ADMIN — ACETYLCYSTEINE 1 ML: 200 SOLUTION ORAL; RESPIRATORY (INHALATION) at 01:30

## 2019-01-19 RX ADMIN — ACETYLCYSTEINE 1 ML: 200 SOLUTION ORAL; RESPIRATORY (INHALATION) at 08:00

## 2019-01-19 RX ADMIN — AZITHROMYCIN SCH MG: 250 TABLET, FILM COATED ORAL at 08:42

## 2019-01-19 RX ADMIN — THERA TABS 1 TAB: TAB at 08:42

## 2019-01-19 RX ADMIN — PANTOPRAZOLE SODIUM 1 MG: 40 TABLET, DELAYED RELEASE ORAL at 05:46

## 2019-01-19 RX ADMIN — ENOXAPARIN SODIUM 1 MG: 100 INJECTION SUBCUTANEOUS at 08:45

## 2019-01-19 RX ADMIN — ENOXAPARIN SODIUM SCH MG: 100 INJECTION SUBCUTANEOUS at 08:45

## 2019-01-19 RX ADMIN — IPRATROPIUM BROMIDE AND ALBUTEROL SULFATE 1 ML: .5; 3 SOLUTION RESPIRATORY (INHALATION) at 01:30

## 2019-01-19 RX ADMIN — OLANZAPINE SCH MG: 2.5 TABLET, FILM COATED ORAL at 08:42

## 2019-01-19 NOTE — PN
Date/Time of Note


Date/Time of Note


DATE: 1/19/19 


TIME: 14:58





Assessment/Plan


VTE Prophylaxis


Risk score (from Ns)>0 risk:  6


SCD applied (from Ns):  Yes


Pharmacological prophylaxis:  LMWH





Lines/Catheters


IV Catheter Type (from [facility]):  Saline Lock


Urinary Cath still in place:  No





Assessment/Plan


Hospital Course








Assessment and plan


1.  Acute hypoxic respiratory failure, stable Taper O2 as possible


2.  Bronchitis vs pneumonia vs viral illness, stable finish empiric therapy.  


influenza was negative


3.  Dementia with delirium supportive care continue trazodone and Ativan as 


needed.  Reorient/DCed tethers.


4.  Failure to thrive back to assisted living versus sniff


5.  Chronic hypothyroidism


6.  Chronic COPD


7.  Abn EKG: poor R through septal leads, asymptomatic, continue medical m


anagement 





S: 


1/14 less pulmonary distress. Delirium and anxiety overnight.  Will need family 


at bed side during daytime.  Discontinue O2/Akbar when stable: 


1/17 no distress except anxiety.


1/18: sitter dced. patient better; doesnt require sitter/ restraints. continue 


fall precautions at snf.


1/19: No events overnight.





Objective: Vital signs stable





Physical exam


No pallor JVD droop


Regular no murmur rub gallop


ctab no tachypnea


Bs+ nt nd; no RRG


No edemaHomans


Result Diagram:  


1/19/19 0610                                                                    


           1/19/19 0610





Results 24hrs





Laboratory Tests


               Test
                                1/19/19
06:10


               White Blood Count                          13.4  H


               Red Blood Count                             4.96


               Hemoglobin                                  14.7


               Hematocrit                                  46.0


               Mean Corpuscular Volume                     92.7


               Mean Corpuscular Hemoglobin                 29.6


               Mean Corpuscular Hemoglobin
Concent        32.0  



               Red Cell Distribution Width                14.7  H


               Platelet Count                               243


               Mean Platelet Volume                       11.4  H


               Immature Granulocytes %                   1.500  H


               Neutrophils %                               67.9


               Lymphocytes %                               20.1


               Monocytes %                                  6.0


               Eosinophils %                                3.8


               Basophils %                                  0.7


               Nucleated Red Blood Cells %                  0.0


               Immature Granulocytes #                   0.200  H


               Neutrophils #                               9.1  H


               Lymphocytes #                                2.7


               Monocytes #                                  0.8


               Eosinophils #                                0.5


               Basophils #                                  0.1


               Nucleated Red Blood Cells #                  0.0


               Sodium Level                                 139


               Potassium Level                              3.9


               Chloride Level                               102


               Carbon Dioxide Level                         33  H


               Anion Gap                                     4  L


               Blood Urea Nitrogen                           15


               Creatinine                                  0.87


               Est Glomerular Filtrat Rate
mL/min     



               Glucose Level                                105


               Calcium Level                                9.5








Exam/Review of Systems


Vital Signs


Vitals





Vital Signs


  Date      Temp  Pulse  Resp  B/P (MAP)   Pulse Ox  O2          O2 Flow    FiO2


Time                                                 Delivery    Rate


   1/19/19           84    18


     08:01


   1/19/19                                                             2.0


     08:01


   1/19/19  97.8                   133/92        97


     07:48                          (106)


   1/19/19                                           Nasal


     01:32                                           Cannula


   1/18/19                                                                    21


     15:29








Intake and Output





1/18/19 1/18/19 1/19/19





1515:00


23:00


07:00





IntakeIntake Total


360 ml


240 ml





BalanceBalance


360 ml


240 ml














Medications


Medications





Current Medications


IV Flush (NS 3 ml) 3 ml PER PROTOCOL IV ;  Start 1/12/19 at 12:30


Ondansetron HCl (Zofran Inj) 4 mg Q6H  PRN IV NAUSEA AND/OR VOMITING;  Start 


1/12/19 at 12:30


Hydralazine HCl (Apresoline) 10 mg Q6H  PRN IV SBP>160 Last administered on 


1/13/19at 07:59; Admin Dose 10 MG;  Start 1/12/19 at 14:00


Loratadine (Claritin) 10 mg DAILY PO  Last administered on 1/19/19at 08:42; 


Admin Dose 10 MG;  Start 1/13/19 at 09:00


Multivitamins Therapeutic (Theragran) 1 tab DAILY PO  Last administered on 


1/19/19at 08:42; Admin Dose 1 TAB;  Start 1/13/19 at 09:00


Enoxaparin Sodium (Lovenox) 40 mg DAILY SC  Last administered on 1/19/19at 


08:45; Admin Dose 40 MG;  Start 1/13/19 at 09:00


Albuterol/ Ipratropium (Duoneb) 3 ml Q2H RESP THERAPY  PRN HHN SHORTNESS OF 


BREATH Last administered on 1/19/19at 01:30; Admin Dose 3 ML;  Start 1/12/19 at 


14:30


Levothyroxine Sodium (Synthroid) 88 mcg DAILY@06 PO  Last administered on 


1/19/19at 05:46; Admin Dose 88 MCG;  Start 1/14/19 at 06:00


Benzonatate (Tessalon) 100 mg TID  PRN PO COUGH Last administered on 1/16/19at 


20:38; Admin Dose 100 MG;  Start 1/14/19 at 04:30


Pantoprazole (Protonix Tab) 40 mg DAILY@06 PO  Last administered on 1/19/19at 


05:46; Admin Dose 40 MG;  Start 1/15/19 at 06:00


Docusate Sodium (Colace) 100 mg HS PO  Last administered on 1/18/19at 21:24; 


Admin Dose 100 MG;  Start 1/14/19 at 21:00


Quetiapine Fumarate (Seroquel) 25 mg HS  PRN PO AGITATION/ANXIETY Last 


administered on 1/19/19at 00:09; Admin Dose 25 MG;  Start 1/14/19 at 11:00


Acetylcysteine (Mucomyst) 2 ml Q8H RESP  THERAPY NEB  Last administered on 


1/19/19at 08:00; Admin Dose 2 ML;  Start 1/17/19 at 16:00


Olanzapine (Zyprexa) 2.5 mg BID PO  Last administered on 1/19/19at 08:42; Admin 


Dose 2.5 MG;  Start 1/18/19 at 11:00


Azithromycin (Zithromax) 500 mg DAILY PO  Last administered on 1/19/19at 08:42; 


Admin Dose 500 MG;  Start 1/18/19 at 11:00











FRANNY LEON MD         Jan 19, 2019 14:59

## 2022-08-01 NOTE — PDOCDIS
8/1/2022    Mayi Horner  [address] [facility] [address]      Dear Mayi,    There’s no question about it - preventive care can save lives. Many health problems start out silently without symptoms. Preventive care is often the only way to catch these problems in early stages, when they can be more successfully treated.     Our records show that you are due for the screening(s) listed below. If you have completed these screening(s), please call us so we can update your record.    Screening Mammogram  · Mammogram: A mammogram can find cancer long before a lump can be felt on the breast. To schedule your Mammogram, please call [phone].      Your health is important to us. Please feel free to call my office at [phone] or make an appointment to discuss the best screening options for you.     Sincerely,      Ulisses Stinson, DO   [facility] [address] [address]  Dept: [phone]  Dept Fax: [phone]     Enclosures:      Mammography  Mammography is an X-ray exam of your breast tissue. The image it makes is called a mammogram. It can help find problems with your breasts, such as cysts or cancer. Mammography is the best breast cancer screening tool available.     Be proactive   Have mammograms and breast exams as often as your healthcare provider advises. Also be sure you know how your breasts normally look and feel. This makes it easier to see any changes. Report changes to your provider as soon as you can. Finally, check your insurance to know what's covered.   Types of mammography    · X-ray. Uses low dose X-ray to see breast tissue.  · Digital. This uses electronics that convert X-rays into mammogram pictures of the breast. It uses less radiation.  · Computer-aided detection (CAD).  These look for abnormal areas of calcium deposits, and density.  · 3-D mammogram. Images of the breast from different angles are taken to make a 3-D  Discharge Instructions


CONDITION


                [id number]
Patient Condition:  [id number]
Stable








HOME CARE INSTRUCTIONS:


                [id number]
Special Diet:  [id number]
pureed diet








ACTIVITY:


     [id number]
Activity Restrictions:  [id number]
Slowly Increase Activity


                                        Do not Drive








FOLLOW UP/APPOINTMENTS


Follow-up Plan


appt PCP & Hospice Consult 1-2wks











FRANNY LEON MD         Jan 17, 2019 12:41 image set.    How do I get ready for a mammogram?    · Schedule the test for 1 week after your period. Your breasts are less sore and dense then.  · Make sure your clinic gets images of your last mammogram if they were done somewhere else. This lets the provider compare the 2 sets of images for any changes.  · On the day of your test, don’t use deodorant, powder, or perfume.  · Wear a top that you can easily take off.    What happens during a mammogram?   · You will need to undress from the waist up.  · The technologist will position your breast to get the best results.  · Each of your breasts will be compressed one at a time. This helps get the most complete image.  · Your breasts will be repositioned to get at least 2 views of each breast.    What happens after a mammogram?  · More X-rays or an ultrasound are sometimes needed. If not done at the time of your initial mammogram, you’ll be called to schedule them.  · You should get your test results in writing. Ask about this at your appointment.  · Have mammograms as often as your healthcare provider advises.    What to tell your provider   Tell your provider if:  · You’re pregnant or think you could be  · You have breast implants  · You have any scars or moles on or near your breasts  · You’ve had a breast biopsy or surgery  · You’re breastfeeding  [employer] last reviewed this educational content on 9/1/2021    © 3114-4064 [employer]. All rights reserved. This information is not intended as a substitute for professional medical care. Always follow your healthcare professional's instructions.            [facility] offers online access to your health information via [email or web address]